# Patient Record
Sex: FEMALE | Race: WHITE | Employment: OTHER
[De-identification: names, ages, dates, MRNs, and addresses within clinical notes are randomized per-mention and may not be internally consistent; named-entity substitution may affect disease eponyms.]

---

## 2017-01-16 RX ORDER — OMEPRAZOLE 40 MG/1
40 CAPSULE, DELAYED RELEASE ORAL DAILY
Qty: 30 CAPSULE | Refills: 3 | Status: ON HOLD | OUTPATIENT
Start: 2017-01-16 | End: 2020-06-22

## 2017-01-17 ENCOUNTER — OFFICE VISIT (OUTPATIENT)
Dept: FAMILY MEDICINE CLINIC | Facility: CLINIC | Age: 82
End: 2017-01-17

## 2017-01-17 VITALS
HEART RATE: 68 BPM | SYSTOLIC BLOOD PRESSURE: 142 MMHG | BODY MASS INDEX: 20.86 KG/M2 | HEIGHT: 65 IN | DIASTOLIC BLOOD PRESSURE: 67 MMHG | OXYGEN SATURATION: 96 % | WEIGHT: 125.2 LBS | TEMPERATURE: 97 F

## 2017-01-17 DIAGNOSIS — R10.2 PELVIC PRESSURE IN FEMALE: Primary | ICD-10-CM

## 2017-01-17 DIAGNOSIS — M53.3 SACRAL PAIN: ICD-10-CM

## 2017-01-17 DIAGNOSIS — K62.89 RECTAL PAIN: ICD-10-CM

## 2017-01-17 LAB
BILIRUBIN, POC: NORMAL
BLOOD URINE, POC: NEGATIVE
CLARITY, POC: CLEAR
COLOR, POC: YELLOW
GLUCOSE URINE, POC: NEGATIVE
HEMOCCULT STL QL: NEGATIVE
HEMOCCULT STL QL: NORMAL
HEMOCCULT STL QL: NORMAL
KETONES, POC: NORMAL
LEUKOCYTE EST, POC: NEGATIVE
NITRITE, POC: NEGATIVE
PH, POC: 5.5
PROTEIN, POC: NORMAL
SPECIFIC GRAVITY, POC: 1.03
UROBILINOGEN, POC: 1

## 2017-01-17 PROCEDURE — 74020: CPT | Performed by: NURSE PRACTITIONER

## 2017-01-17 PROCEDURE — 1090F PRES/ABSN URINE INCON ASSESS: CPT | Performed by: NURSE PRACTITIONER

## 2017-01-17 PROCEDURE — G8428 CUR MEDS NOT DOCUMENT: HCPCS | Performed by: NURSE PRACTITIONER

## 2017-01-17 PROCEDURE — 99214 OFFICE O/P EST MOD 30 MIN: CPT | Performed by: NURSE PRACTITIONER

## 2017-01-17 PROCEDURE — G8484 FLU IMMUNIZE NO ADMIN: HCPCS | Performed by: NURSE PRACTITIONER

## 2017-01-17 PROCEDURE — 4040F PNEUMOC VAC/ADMIN/RCVD: CPT | Performed by: NURSE PRACTITIONER

## 2017-01-17 PROCEDURE — 81003 URINALYSIS AUTO W/O SCOPE: CPT | Performed by: NURSE PRACTITIONER

## 2017-01-17 PROCEDURE — G8419 CALC BMI OUT NRM PARAM NOF/U: HCPCS | Performed by: NURSE PRACTITIONER

## 2017-01-17 PROCEDURE — 1036F TOBACCO NON-USER: CPT | Performed by: NURSE PRACTITIONER

## 2017-01-17 PROCEDURE — 1123F ACP DISCUSS/DSCN MKR DOCD: CPT | Performed by: NURSE PRACTITIONER

## 2017-01-17 PROCEDURE — 72220 X-RAY EXAM SACRUM TAILBONE: CPT | Performed by: NURSE PRACTITIONER

## 2017-01-17 PROCEDURE — 82272 OCCULT BLD FECES 1-3 TESTS: CPT | Performed by: NURSE PRACTITIONER

## 2017-01-17 PROCEDURE — G8400 PT W/DXA NO RESULTS DOC: HCPCS | Performed by: NURSE PRACTITIONER

## 2017-01-17 ASSESSMENT — ENCOUNTER SYMPTOMS
RECTAL PAIN: 1
ABDOMINAL DISTENTION: 0
VOMITING: 0
DIARRHEA: 0
COUGH: 0
NAUSEA: 0
BLOOD IN STOOL: 0
SHORTNESS OF BREATH: 0
CHEST TIGHTNESS: 0
ANAL BLEEDING: 0
ABDOMINAL PAIN: 0
CONSTIPATION: 0

## 2020-06-21 ENCOUNTER — APPOINTMENT (OUTPATIENT)
Dept: GENERAL RADIOLOGY | Facility: CLINIC | Age: 85
DRG: 494 | End: 2020-06-21
Payer: MEDICARE

## 2020-06-21 ENCOUNTER — HOSPITAL ENCOUNTER (INPATIENT)
Age: 85
LOS: 3 days | Discharge: NURSING, REHAB OR LONG-TERM CARE | DRG: 494 | End: 2020-06-24
Attending: EMERGENCY MEDICINE | Admitting: INTERNAL MEDICINE
Payer: MEDICARE

## 2020-06-21 PROBLEM — S82.892A CLOSED LEFT ANKLE FRACTURE, INITIAL ENCOUNTER: Status: RESOLVED | Noted: 2020-06-21 | Resolved: 2020-06-21

## 2020-06-21 PROBLEM — S82.892A CLOSED LEFT ANKLE FRACTURE, INITIAL ENCOUNTER: Status: ACTIVE | Noted: 2020-06-21

## 2020-06-21 PROCEDURE — 73562 X-RAY EXAM OF KNEE 3: CPT

## 2020-06-21 PROCEDURE — 1200000000 HC SEMI PRIVATE

## 2020-06-21 PROCEDURE — 99285 EMERGENCY DEPT VISIT HI MDM: CPT

## 2020-06-21 PROCEDURE — 96375 TX/PRO/DX INJ NEW DRUG ADDON: CPT

## 2020-06-21 PROCEDURE — 73610 X-RAY EXAM OF ANKLE: CPT

## 2020-06-21 PROCEDURE — 96374 THER/PROPH/DIAG INJ IV PUSH: CPT

## 2020-06-21 PROCEDURE — 6360000002 HC RX W HCPCS: Performed by: EMERGENCY MEDICINE

## 2020-06-21 RX ORDER — MORPHINE SULFATE 2 MG/ML
2 INJECTION, SOLUTION INTRAMUSCULAR; INTRAVENOUS ONCE
Status: COMPLETED | OUTPATIENT
Start: 2020-06-21 | End: 2020-06-21

## 2020-06-21 RX ORDER — KETOROLAC TROMETHAMINE 15 MG/ML
15 INJECTION, SOLUTION INTRAMUSCULAR; INTRAVENOUS ONCE
Status: COMPLETED | OUTPATIENT
Start: 2020-06-21 | End: 2020-06-21

## 2020-06-21 RX ORDER — TRAMADOL HYDROCHLORIDE 50 MG/1
50 TABLET ORAL EVERY 6 HOURS PRN
Qty: 12 TABLET | Refills: 0 | Status: SHIPPED | OUTPATIENT
Start: 2020-06-21 | End: 2020-06-24 | Stop reason: HOSPADM

## 2020-06-21 RX ADMIN — MORPHINE SULFATE 2 MG: 2 INJECTION, SOLUTION INTRAMUSCULAR; INTRAVENOUS at 21:43

## 2020-06-21 RX ADMIN — KETOROLAC TROMETHAMINE 15 MG: 15 INJECTION, SOLUTION INTRAMUSCULAR; INTRAVENOUS at 20:52

## 2020-06-21 ASSESSMENT — PAIN DESCRIPTION - PROGRESSION
CLINICAL_PROGRESSION: RAPIDLY IMPROVING
CLINICAL_PROGRESSION: GRADUALLY IMPROVING

## 2020-06-21 ASSESSMENT — PAIN DESCRIPTION - LOCATION
LOCATION: ANKLE

## 2020-06-21 ASSESSMENT — PAIN SCALES - GENERAL
PAINLEVEL_OUTOF10: 7
PAINLEVEL_OUTOF10: 7
PAINLEVEL_OUTOF10: 9
PAINLEVEL_OUTOF10: 2
PAINLEVEL_OUTOF10: 9

## 2020-06-21 ASSESSMENT — PAIN DESCRIPTION - PAIN TYPE
TYPE: ACUTE PAIN

## 2020-06-22 ENCOUNTER — APPOINTMENT (OUTPATIENT)
Dept: GENERAL RADIOLOGY | Age: 85
DRG: 494 | End: 2020-06-22
Payer: MEDICARE

## 2020-06-22 ENCOUNTER — APPOINTMENT (OUTPATIENT)
Dept: CT IMAGING | Age: 85
DRG: 494 | End: 2020-06-22
Payer: MEDICARE

## 2020-06-22 PROBLEM — S82.892A CLOSED LEFT ANKLE FRACTURE, INITIAL ENCOUNTER: Status: ACTIVE | Noted: 2020-06-22

## 2020-06-22 PROBLEM — S82.892A CLOSED LEFT ANKLE FRACTURE, INITIAL ENCOUNTER: Status: RESOLVED | Noted: 2020-06-22 | Resolved: 2020-06-22

## 2020-06-22 LAB
ANION GAP SERPL CALCULATED.3IONS-SCNC: 10 MMOL/L (ref 9–17)
BUN BLDV-MCNC: 17 MG/DL (ref 8–23)
BUN/CREAT BLD: 22 (ref 9–20)
CALCIUM SERPL-MCNC: 8.6 MG/DL (ref 8.6–10.4)
CHLORIDE BLD-SCNC: 102 MMOL/L (ref 98–107)
CO2: 26 MMOL/L (ref 20–31)
CREAT SERPL-MCNC: 0.79 MG/DL (ref 0.5–0.9)
GFR AFRICAN AMERICAN: >60 ML/MIN
GFR NON-AFRICAN AMERICAN: >60 ML/MIN
GFR SERPL CREATININE-BSD FRML MDRD: ABNORMAL ML/MIN/{1.73_M2}
GFR SERPL CREATININE-BSD FRML MDRD: ABNORMAL ML/MIN/{1.73_M2}
GLUCOSE BLD-MCNC: 114 MG/DL (ref 70–99)
HCT VFR BLD CALC: 35.3 % (ref 36.3–47.1)
HEMOGLOBIN: 11.2 G/DL (ref 11.9–15.1)
INR BLD: 1
MCH RBC QN AUTO: 28.9 PG (ref 25.2–33.5)
MCHC RBC AUTO-ENTMCNC: 31.7 G/DL (ref 28.4–34.8)
MCV RBC AUTO: 91 FL (ref 82.6–102.9)
NRBC AUTOMATED: 0 PER 100 WBC
PDW BLD-RTO: 14.6 % (ref 11.8–14.4)
PLATELET # BLD: 196 K/UL (ref 138–453)
PMV BLD AUTO: 9.9 FL (ref 8.1–13.5)
POTASSIUM SERPL-SCNC: 4.1 MMOL/L (ref 3.7–5.3)
PROTHROMBIN TIME: 13.1 SEC (ref 11.5–14.2)
RBC # BLD: 3.88 M/UL (ref 3.95–5.11)
SARS-COV-2, PCR: NORMAL
SARS-COV-2, RAPID: NORMAL
SARS-COV-2: NOT DETECTED
SODIUM BLD-SCNC: 138 MMOL/L (ref 135–144)
SOURCE: NORMAL
WBC # BLD: 7.7 K/UL (ref 3.5–11.3)

## 2020-06-22 PROCEDURE — APPSS180 APP SPLIT SHARED TIME > 60 MINUTES: Performed by: NURSE PRACTITIONER

## 2020-06-22 PROCEDURE — 73590 X-RAY EXAM OF LOWER LEG: CPT

## 2020-06-22 PROCEDURE — 6370000000 HC RX 637 (ALT 250 FOR IP): Performed by: INTERNAL MEDICINE

## 2020-06-22 PROCEDURE — 2580000003 HC RX 258: Performed by: NURSE PRACTITIONER

## 2020-06-22 PROCEDURE — 82306 VITAMIN D 25 HYDROXY: CPT

## 2020-06-22 PROCEDURE — 1200000000 HC SEMI PRIVATE

## 2020-06-22 PROCEDURE — 85027 COMPLETE CBC AUTOMATED: CPT

## 2020-06-22 PROCEDURE — 6370000000 HC RX 637 (ALT 250 FOR IP): Performed by: NURSE PRACTITIONER

## 2020-06-22 PROCEDURE — U0003 INFECTIOUS AGENT DETECTION BY NUCLEIC ACID (DNA OR RNA); SEVERE ACUTE RESPIRATORY SYNDROME CORONAVIRUS 2 (SARS-COV-2) (CORONAVIRUS DISEASE [COVID-19]), AMPLIFIED PROBE TECHNIQUE, MAKING USE OF HIGH THROUGHPUT TECHNOLOGIES AS DESCRIBED BY CMS-2020-01-R: HCPCS

## 2020-06-22 PROCEDURE — 73620 X-RAY EXAM OF FOOT: CPT

## 2020-06-22 PROCEDURE — 80048 BASIC METABOLIC PNL TOTAL CA: CPT

## 2020-06-22 PROCEDURE — 99222 1ST HOSP IP/OBS MODERATE 55: CPT | Performed by: INTERNAL MEDICINE

## 2020-06-22 PROCEDURE — 73700 CT LOWER EXTREMITY W/O DYE: CPT

## 2020-06-22 PROCEDURE — 36415 COLL VENOUS BLD VENIPUNCTURE: CPT

## 2020-06-22 PROCEDURE — 99254 IP/OBS CNSLTJ NEW/EST MOD 60: CPT | Performed by: ORTHOPAEDIC SURGERY

## 2020-06-22 PROCEDURE — 85610 PROTHROMBIN TIME: CPT

## 2020-06-22 PROCEDURE — 84134 ASSAY OF PREALBUMIN: CPT

## 2020-06-22 PROCEDURE — 6360000002 HC RX W HCPCS: Performed by: NURSE PRACTITIONER

## 2020-06-22 RX ORDER — PANTOPRAZOLE SODIUM 40 MG/1
40 TABLET, DELAYED RELEASE ORAL
Status: DISCONTINUED | OUTPATIENT
Start: 2020-06-22 | End: 2020-06-22

## 2020-06-22 RX ORDER — NICOTINE 21 MG/24HR
1 PATCH, TRANSDERMAL 24 HOURS TRANSDERMAL DAILY PRN
Status: DISCONTINUED | OUTPATIENT
Start: 2020-06-22 | End: 2020-06-24 | Stop reason: HOSPADM

## 2020-06-22 RX ORDER — MAGNESIUM SULFATE 1 G/100ML
1 INJECTION INTRAVENOUS PRN
Status: DISCONTINUED | OUTPATIENT
Start: 2020-06-22 | End: 2020-06-24 | Stop reason: HOSPADM

## 2020-06-22 RX ORDER — POLYETHYLENE GLYCOL 3350 17 G/17G
17 POWDER, FOR SOLUTION ORAL DAILY PRN
Status: DISCONTINUED | OUTPATIENT
Start: 2020-06-22 | End: 2020-06-24 | Stop reason: HOSPADM

## 2020-06-22 RX ORDER — FAMOTIDINE 20 MG/1
40 TABLET, FILM COATED ORAL NIGHTLY
Status: DISCONTINUED | OUTPATIENT
Start: 2020-06-22 | End: 2020-06-24 | Stop reason: HOSPADM

## 2020-06-22 RX ORDER — OXYBUTYNIN CHLORIDE 5 MG/1
5 TABLET, EXTENDED RELEASE ORAL DAILY
Status: DISCONTINUED | OUTPATIENT
Start: 2020-06-22 | End: 2020-06-24 | Stop reason: HOSPADM

## 2020-06-22 RX ORDER — SODIUM CHLORIDE 0.9 % (FLUSH) 0.9 %
10 SYRINGE (ML) INJECTION PRN
Status: DISCONTINUED | OUTPATIENT
Start: 2020-06-22 | End: 2020-06-24 | Stop reason: HOSPADM

## 2020-06-22 RX ORDER — HYDROCHLOROTHIAZIDE 25 MG/1
12.5 TABLET ORAL DAILY
Status: DISCONTINUED | OUTPATIENT
Start: 2020-06-22 | End: 2020-06-24 | Stop reason: HOSPADM

## 2020-06-22 RX ORDER — POTASSIUM CHLORIDE 7.45 MG/ML
10 INJECTION INTRAVENOUS PRN
Status: DISCONTINUED | OUTPATIENT
Start: 2020-06-22 | End: 2020-06-24 | Stop reason: HOSPADM

## 2020-06-22 RX ORDER — ACETAMINOPHEN 650 MG/1
650 SUPPOSITORY RECTAL EVERY 6 HOURS PRN
Status: DISCONTINUED | OUTPATIENT
Start: 2020-06-22 | End: 2020-06-24 | Stop reason: HOSPADM

## 2020-06-22 RX ORDER — LANOLIN ALCOHOL/MO/W.PET/CERES
1000 CREAM (GRAM) TOPICAL DAILY
Status: DISCONTINUED | OUTPATIENT
Start: 2020-06-22 | End: 2020-06-24 | Stop reason: HOSPADM

## 2020-06-22 RX ORDER — ACETAMINOPHEN 325 MG/1
650 TABLET ORAL EVERY 6 HOURS PRN
Status: DISCONTINUED | OUTPATIENT
Start: 2020-06-22 | End: 2020-06-24 | Stop reason: HOSPADM

## 2020-06-22 RX ORDER — POTASSIUM CHLORIDE 20 MEQ/1
40 TABLET, EXTENDED RELEASE ORAL PRN
Status: DISCONTINUED | OUTPATIENT
Start: 2020-06-22 | End: 2020-06-24 | Stop reason: HOSPADM

## 2020-06-22 RX ORDER — PROMETHAZINE HYDROCHLORIDE 12.5 MG/1
12.5 TABLET ORAL EVERY 6 HOURS PRN
Status: DISCONTINUED | OUTPATIENT
Start: 2020-06-22 | End: 2020-06-24 | Stop reason: HOSPADM

## 2020-06-22 RX ORDER — MORPHINE SULFATE 2 MG/ML
2 INJECTION, SOLUTION INTRAMUSCULAR; INTRAVENOUS
Status: DISCONTINUED | OUTPATIENT
Start: 2020-06-22 | End: 2020-06-24 | Stop reason: HOSPADM

## 2020-06-22 RX ORDER — BUSPIRONE HYDROCHLORIDE 10 MG/1
10 TABLET ORAL 2 TIMES DAILY
Status: DISCONTINUED | OUTPATIENT
Start: 2020-06-22 | End: 2020-06-24 | Stop reason: HOSPADM

## 2020-06-22 RX ORDER — LEVOTHYROXINE SODIUM 0.03 MG/1
25 TABLET ORAL DAILY
Status: DISCONTINUED | OUTPATIENT
Start: 2020-06-22 | End: 2020-06-24 | Stop reason: HOSPADM

## 2020-06-22 RX ORDER — PANTOPRAZOLE SODIUM 40 MG/1
40 TABLET, DELAYED RELEASE ORAL EVERY MORNING
Status: ON HOLD | COMMUNITY
End: 2020-06-24 | Stop reason: HOSPADM

## 2020-06-22 RX ORDER — SODIUM CHLORIDE 0.9 % (FLUSH) 0.9 %
10 SYRINGE (ML) INJECTION EVERY 12 HOURS SCHEDULED
Status: DISCONTINUED | OUTPATIENT
Start: 2020-06-22 | End: 2020-06-24 | Stop reason: HOSPADM

## 2020-06-22 RX ORDER — TRAZODONE HYDROCHLORIDE 100 MG/1
100 TABLET ORAL NIGHTLY
Status: DISCONTINUED | OUTPATIENT
Start: 2020-06-22 | End: 2020-06-24 | Stop reason: HOSPADM

## 2020-06-22 RX ORDER — SODIUM CHLORIDE 9 MG/ML
INJECTION, SOLUTION INTRAVENOUS CONTINUOUS
Status: DISCONTINUED | OUTPATIENT
Start: 2020-06-22 | End: 2020-06-24 | Stop reason: HOSPADM

## 2020-06-22 RX ORDER — BUPROPION HYDROCHLORIDE 150 MG/1
300 TABLET ORAL EVERY MORNING
Status: DISCONTINUED | OUTPATIENT
Start: 2020-06-22 | End: 2020-06-24 | Stop reason: HOSPADM

## 2020-06-22 RX ORDER — LANOLIN ALCOHOL/MO/W.PET/CERES
1000 CREAM (GRAM) TOPICAL DAILY
COMMUNITY

## 2020-06-22 RX ORDER — IBUPROFEN 400 MG/1
400 TABLET ORAL EVERY 8 HOURS PRN
Status: DISCONTINUED | OUTPATIENT
Start: 2020-06-22 | End: 2020-06-22 | Stop reason: SDUPTHER

## 2020-06-22 RX ORDER — FAMOTIDINE 40 MG/1
40 TABLET, FILM COATED ORAL NIGHTLY
COMMUNITY

## 2020-06-22 RX ORDER — HYDROCODONE BITARTRATE AND ACETAMINOPHEN 5; 325 MG/1; MG/1
2 TABLET ORAL EVERY 4 HOURS PRN
Status: DISCONTINUED | OUTPATIENT
Start: 2020-06-22 | End: 2020-06-24 | Stop reason: HOSPADM

## 2020-06-22 RX ORDER — FLUTICASONE PROPIONATE 50 MCG
2 SPRAY, SUSPENSION (ML) NASAL DAILY
Status: DISCONTINUED | OUTPATIENT
Start: 2020-06-22 | End: 2020-06-24 | Stop reason: HOSPADM

## 2020-06-22 RX ORDER — HYDROCODONE BITARTRATE AND ACETAMINOPHEN 5; 325 MG/1; MG/1
1 TABLET ORAL EVERY 4 HOURS PRN
Status: DISCONTINUED | OUTPATIENT
Start: 2020-06-22 | End: 2020-06-24 | Stop reason: HOSPADM

## 2020-06-22 RX ORDER — MORPHINE SULFATE 4 MG/ML
4 INJECTION, SOLUTION INTRAMUSCULAR; INTRAVENOUS
Status: DISCONTINUED | OUTPATIENT
Start: 2020-06-22 | End: 2020-06-24 | Stop reason: HOSPADM

## 2020-06-22 RX ORDER — ONDANSETRON 2 MG/ML
4 INJECTION INTRAMUSCULAR; INTRAVENOUS EVERY 6 HOURS PRN
Status: DISCONTINUED | OUTPATIENT
Start: 2020-06-22 | End: 2020-06-24 | Stop reason: HOSPADM

## 2020-06-22 RX ORDER — ALPRAZOLAM 0.5 MG/1
0.5 TABLET ORAL 2 TIMES DAILY PRN
Status: DISCONTINUED | OUTPATIENT
Start: 2020-06-22 | End: 2020-06-24 | Stop reason: HOSPADM

## 2020-06-22 RX ORDER — HYDROCHLOROTHIAZIDE 25 MG/1
25 TABLET ORAL DAILY
Status: DISCONTINUED | OUTPATIENT
Start: 2020-06-22 | End: 2020-06-22

## 2020-06-22 RX ADMIN — ENOXAPARIN SODIUM 40 MG: 40 INJECTION SUBCUTANEOUS at 13:14

## 2020-06-22 RX ADMIN — HYDROCODONE BITARTRATE AND ACETAMINOPHEN 1 TABLET: 5; 325 TABLET ORAL at 18:36

## 2020-06-22 RX ADMIN — SODIUM CHLORIDE: 9 INJECTION, SOLUTION INTRAVENOUS at 18:12

## 2020-06-22 RX ADMIN — BUPROPION HYDROCHLORIDE 300 MG: 150 TABLET, EXTENDED RELEASE ORAL at 16:31

## 2020-06-22 RX ADMIN — LEVOTHYROXINE SODIUM 25 MCG: 0.03 TABLET ORAL at 05:05

## 2020-06-22 RX ADMIN — SODIUM CHLORIDE, PRESERVATIVE FREE 10 ML: 5 INJECTION INTRAVENOUS at 22:13

## 2020-06-22 RX ADMIN — TRAZODONE HYDROCHLORIDE 100 MG: 100 TABLET ORAL at 21:05

## 2020-06-22 RX ADMIN — HYDROCODONE BITARTRATE AND ACETAMINOPHEN 2 TABLET: 5; 325 TABLET ORAL at 10:43

## 2020-06-22 RX ADMIN — CYANOCOBALAMIN TAB 1000 MCG 1000 MCG: 1000 TAB at 16:35

## 2020-06-22 RX ADMIN — PANTOPRAZOLE SODIUM 40 MG: 40 TABLET, DELAYED RELEASE ORAL at 05:05

## 2020-06-22 RX ADMIN — FAMOTIDINE 40 MG: 20 TABLET ORAL at 21:06

## 2020-06-22 RX ADMIN — HYDROCODONE BITARTRATE AND ACETAMINOPHEN 2 TABLET: 5; 325 TABLET ORAL at 02:43

## 2020-06-22 RX ADMIN — BUSPIRONE HYDROCHLORIDE 10 MG: 10 TABLET ORAL at 16:31

## 2020-06-22 RX ADMIN — SODIUM CHLORIDE: 9 INJECTION, SOLUTION INTRAVENOUS at 01:23

## 2020-06-22 RX ADMIN — OXYBUTYNIN CHLORIDE 5 MG: 5 TABLET, EXTENDED RELEASE ORAL at 10:43

## 2020-06-22 ASSESSMENT — PAIN DESCRIPTION - DESCRIPTORS
DESCRIPTORS: ACHING;DISCOMFORT
DESCRIPTORS: ACHING;DISCOMFORT

## 2020-06-22 ASSESSMENT — ENCOUNTER SYMPTOMS
CHEST TIGHTNESS: 0
NAUSEA: 0
ABDOMINAL PAIN: 0
VOMITING: 0
CONSTIPATION: 0
DIARRHEA: 0
COLOR CHANGE: 0
COUGH: 0
BLOOD IN STOOL: 0
WHEEZING: 0
SHORTNESS OF BREATH: 0

## 2020-06-22 ASSESSMENT — PAIN - FUNCTIONAL ASSESSMENT
PAIN_FUNCTIONAL_ASSESSMENT: PREVENTS OR INTERFERES SOME ACTIVE ACTIVITIES AND ADLS
PAIN_FUNCTIONAL_ASSESSMENT: PREVENTS OR INTERFERES SOME ACTIVE ACTIVITIES AND ADLS

## 2020-06-22 ASSESSMENT — PAIN DESCRIPTION - ONSET
ONSET: ON-GOING
ONSET: ON-GOING

## 2020-06-22 ASSESSMENT — PAIN SCALES - GENERAL
PAINLEVEL_OUTOF10: 8
PAINLEVEL_OUTOF10: 3
PAINLEVEL_OUTOF10: 8
PAINLEVEL_OUTOF10: 0
PAINLEVEL_OUTOF10: 5
PAINLEVEL_OUTOF10: 4

## 2020-06-22 ASSESSMENT — PAIN DESCRIPTION - FREQUENCY
FREQUENCY: CONTINUOUS
FREQUENCY: CONTINUOUS

## 2020-06-22 ASSESSMENT — PAIN DESCRIPTION - ORIENTATION
ORIENTATION: LEFT
ORIENTATION: LEFT;LOWER

## 2020-06-22 ASSESSMENT — PAIN DESCRIPTION - LOCATION
LOCATION: ANKLE
LOCATION: LEG

## 2020-06-22 ASSESSMENT — PAIN DESCRIPTION - PROGRESSION
CLINICAL_PROGRESSION: NOT CHANGED
CLINICAL_PROGRESSION: GRADUALLY IMPROVING

## 2020-06-22 ASSESSMENT — PAIN DESCRIPTION - PAIN TYPE
TYPE: ACUTE PAIN
TYPE: ACUTE PAIN

## 2020-06-22 NOTE — H&P
[facility]    HISTORY AND PHYSICAL EXAMINATION            Date:   6/22/2020  Patient name:  Christopher Mccarthy  Date of admission:  6/21/2020  8:34 PM  MRN:   [id number]  Account:  [de-identified]  YOB: 1935  PCP:    FERNANDO Moe CNP  Room:   2009/2009-02  Code Status:    Full Code    Chief Complaint:     Chief Complaint   Patient presents with    Fall    Ankle Pain     History Obtained From:     Patient and electronic medical record. History of Present Illness:     Christopher Mccarthy is a 82 y.o. Non-/non  female who presents with Fall and Ankle Pain   and is admitted to the hospital for the management of Closed trimalleolar fracture of left ankle. The patient reports to the hospital with complaint of fall and left ankle injury. The patient states that she was going down her stairs when she missed stepped and fell. She denies hitting her head or loss of consciousness. She does endorse an injury to her left ankle. She states she was unable to bear weight on it after the fall. She endorses pain to the area that she describes as burning and 9/10 in intensity. She states her pain is aggravated with movement and has been greatly improved with administered analgesics. Orthopedic surgery was consulted in the ER, no immediate plan for surgical intervention. The patient failed ambulation trial in the ER with the use of crutches. She has past medical history that includes both thyroidism, hypertension, anemia and GERD. X-ray left knee indicates no acute osseous abnormality of the left knee. X-ray left ankle indicates acute trimalleolar fracture of the left ankle with lateral talar tilt and posterior talar subluxation.     Past Medical History:     Past Medical History:   Diagnosis Date    Anemia     Anxiety     Depression     GERD (gastroesophageal reflux disease)     Hypertension     Hypothyroidism     Doxycycline; Paroxetine; Paroxetine hcl; Penicillins; and Sulfa antibiotics    Social History:     Tobacco:    reports that she has never smoked. She has never used smokeless tobacco.  Alcohol:      reports current alcohol use. Drug Use:  reports no history of drug use. Family History:     Family History   Problem Relation Age of Onset    Stroke Mother     High Blood Pressure Mother        Review of Systems:     Positive and Negative as described in HPI. Review of Systems   Constitutional: Negative for chills, diaphoresis and fever. HENT: Negative for congestion. Eyes: Negative for visual disturbance. Respiratory: Negative for cough, chest tightness, shortness of breath and wheezing. Cardiovascular: Negative for chest pain, palpitations and leg swelling. Gastrointestinal: Negative for abdominal pain, blood in stool, constipation, diarrhea, nausea and vomiting. Endocrine: Negative for cold intolerance and heat intolerance. Genitourinary: Negative for difficulty urinating, dysuria, frequency and urgency. Musculoskeletal: Positive for arthralgias, gait problem and myalgias. Skin: Negative for color change and rash. Neurological: Negative for dizziness, weakness, light-headedness, numbness and headaches. Hematological: Does not bruise/bleed easily. Psychiatric/Behavioral: The patient is not nervous/anxious. All other systems reviewed and are negative. Physical Exam:   BP (!) 149/62   Pulse 69   Temp 98.1 °F (36.7 °C) (Oral)   Resp 16   Ht 5' 5\" (1.651 m)   Wt 149 lb (67.6 kg)   SpO2 97%   BMI 24.79 kg/m²   Temp (24hrs), Av.2 °F (36.8 °C), Min:98.1 °F (36.7 °C), Max:98.2 °F (36.8 °C)    No results for input(s): POCGLU in the last 72 hours. No intake or output data in the 24 hours ending 20 0054    Physical Exam  Vitals signs and nursing note reviewed. Constitutional:       General: She is not in acute distress. Appearance: She is not diaphoretic.    HENT: Head: Normocephalic and atraumatic. Right Ear: Hearing normal.      Left Ear: Hearing normal.      Nose: Nose normal. No rhinorrhea. Eyes:      General: Lids are normal.      Extraocular Movements:      Right eye: Normal extraocular motion. Left eye: Normal extraocular motion. Conjunctiva/sclera: Conjunctivae normal.      Right eye: Right conjunctiva is not injected. Left eye: Left conjunctiva is not injected. Pupils: Pupils are equal, round, and reactive to light. Pupils are equal.      Right eye: Pupil is reactive. Left eye: Pupil is reactive. Neck:      Musculoskeletal: Neck supple. Thyroid: No thyromegaly. Vascular: No carotid bruit. Trachea: Trachea and phonation normal. No tracheal deviation. Cardiovascular:      Rate and Rhythm: Normal rate and regular rhythm. Pulses: Normal pulses. Heart sounds: Normal heart sounds. No murmur. Pulmonary:      Effort: Pulmonary effort is normal. No respiratory distress. Breath sounds: Normal breath sounds. No stridor. No decreased breath sounds. Abdominal:      General: Bowel sounds are normal. There is no distension. Palpations: Abdomen is soft. There is no mass. Tenderness: There is no abdominal tenderness. There is no guarding. Musculoskeletal:      Right foot: Decreased range of motion. Tenderness present. Comments: Left ankle splinted, ACE wrap in place. Skin:     General: Skin is warm and dry. Findings: No erythema, lesion or rash. Neurological:      Mental Status: She is alert and oriented to person, place, and time. She is not disoriented. Cranial Nerves: No cranial nerve deficit. Psychiatric:         Speech: Speech normal.         Behavior: Behavior normal. Behavior is cooperative. Investigations:      Laboratory Testing:  No results found for this or any previous visit (from the past 24 hour(s)).     Imaging/Diagnostics:  Xr Knee Left (3 Views)    Result

## 2020-06-22 NOTE — ED PROVIDER NOTES
TABLET        TRIAMCINOLONE (KENALOG) 0.1 % CREAM           ALLERGIES     is allergic to ampicillin; erythromycin; doxycycline; paroxetine; paroxetine hcl; penicillins; and sulfa antibiotics. FAMILY HISTORY     She indicated that her mother is . She indicated that her father is . family history includes High Blood Pressure in her mother; Stroke in her mother. SOCIAL HISTORY      reports that she has never smoked. She has never used smokeless tobacco. She reports current alcohol use. She reports that she does not use drugs. PHYSICAL EXAM     INITIAL VITALS:  height is 5' 5\" (1.651 m) and weight is 63.5 kg (140 lb). Her oral temperature is 98.2 °F (36.8 °C). Her blood pressure is 175/71 (abnormal) and her pulse is 67. Her respiration is 18 and oxygen saturation is 97%. Gen.: Patient very pleasant, elderly female in no apparent distress. HEENT: Head is atraumatic. Respiratory: Lung sounds are clear bilateral.  Cardiac: Heart is regular rate and rhythm  Extremity: Examination left lower extremity reveals gross deformity at the ankle with swelling and ecchymosis. Neurovascularly intact distally    DIFFERENTIAL DIAGNOSIS/ MDM:     Fracture    DIAGNOSTIC RESULTS         RADIOLOGY:   I directly visualized the following  images and reviewed the radiologist interpretations:  XR ANKLE LEFT (MIN 3 VIEWS)   Final Result   Acute trimalleolar fracture of the left ankle with lateral talar tilt and   posterior talar subluxation. XR KNEE LEFT (3 VIEWS)   Final Result   No acute osseous abnormality of the left knee.                LABS:  Labs Reviewed - No data to display      EMERGENCY DEPARTMENT COURSE:   Vitals:    Vitals:    20   BP:  (!) 175/71   Pulse: 67    Resp: 18    Temp: 98.2 °F (36.8 °C)    TempSrc: Oral    SpO2: 97%    Weight: 63.5 kg (140 lb)    Height: 5' 5\" (1.651 m)      -------------------------  BP: (!) 175/71, Temp: 98.2 °F (36.8 °C), Pulse: 67, Resp: 18    Orders Placed This Encounter   Medications    ketorolac (TORADOL) injection 15 mg    morphine (PF) injection 2 mg    traMADol (ULTRAM) 50 MG tablet     Sig: Take 1 tablet by mouth every 6 hours as needed for Pain for up to 3 days. Intended supply: 3 days. Take lowest dose possible to manage pain     Dispense:  12 tablet     Refill:  0           Re-evaluation Notes    X-ray, per radiologist, shows a trimalleolar fracture. I did speak with orthopedist on-call, who did not recommend admission. He would see her in the office. We did give the patient crutches. Patient was not able to maneuver with crutches because a fall risk. I do feel she needs to be admitted. She will eventually need surgery and rehab. I did speak with nurse practitioner on-call Niocle Davis, who is agreeable        FINAL IMPRESSION      1. Closed left ankle fracture, initial encounter          DISPOSITION/PLAN   DISPOSITION Decision To Admit 06/21/2020 10:43:34 PM      Condition on Disposition    Stable    PATIENT REFERRED TO:  Quinn Schroeder MD  [address]  [phone]    In 1 day        DISCHARGE MEDICATIONS:  New Prescriptions    TRAMADOL (ULTRAM) 50 MG TABLET    Take 1 tablet by mouth every 6 hours as needed for Pain for up to 3 days. Intended supply: 3 days. Take lowest dose possible to manage pain       (Please note that portions of this note were completed with a voice recognition program.  Efforts were made to edit the dictations but occasionally words are mis-transcribed.)    Lopez MD, F.A.C.E.P.   Attending Emergency Physician        Emilia Carmona MD  06/21/20 [id number]

## 2020-06-22 NOTE — CONSULTS
Aj Putt, APRN - CNP        potassium chloride (KLOR-CON M) extended release tablet 40 mEq  40 mEq Oral PRN Aj Putt, APRN - CNP        Or    potassium bicarb-citric acid (EFFER-K) effervescent tablet 40 mEq  40 mEq Oral PRN Aj Putt, APRN - CNP        Or    potassium chloride 10 mEq/100 mL IVPB (Peripheral Line)  10 mEq Intravenous PRN Aj Putt, APRN - CNP        magnesium sulfate 1 g in dextrose 5% 100 mL IVPB  1 g Intravenous PRN Aj Putt, APRN - CNP        acetaminophen (TYLENOL) tablet 650 mg  650 mg Oral Q6H PRN Aj Putt, APRN - CNP        Or    acetaminophen (TYLENOL) suppository 650 mg  650 mg Rectal Q6H PRN Aj Putt, APRN - CNP        polyethylene glycol (GLYCOLAX) packet 17 g  17 g Oral Daily PRN Aj Putt, APRN - CNP        promethazine (PHENERGAN) tablet 12.5 mg  12.5 mg Oral Q6H PRN Aj Putt, APRN - CNP        Or    ondansetron (ZOFRAN) injection 4 mg  4 mg Intravenous Q6H PRN Aj Putt, APRN - CNP        nicotine (NICODERM CQ) 21 MG/24HR 1 patch  1 patch Transdermal Daily PRN Aj Putt, APRN - CNP        enoxaparin (LOVENOX) injection 40 mg  40 mg Subcutaneous Daily Aj Putt, APRN - CNP   Stopped at 06/23/20 0900    0.9 % sodium chloride infusion   Intravenous Continuous Aj Putt, APRN - CNP 75 mL/hr at 06/22/20 0123      HYDROcodone-acetaminophen (NORCO) 5-325 MG per tablet 1 tablet  1 tablet Oral Q4H PRN Aj Putt, APRN - CNP        Or    HYDROcodone-acetaminophen (NORCO) 5-325 MG per tablet 2 tablet  2 tablet Oral Q4H PRN Aj Putt, APRN - CNP   2 tablet at 06/22/20 1043    morphine (PF) injection 2 mg  2 mg Intravenous Q2H PRN Aj Putt, APRN - CNP        Or    morphine sulfate (PF) injection 4 mg  4 mg Intravenous Q2H PRN Aj Putt, APRN - CNP        ALPRAZolam Pascual Kulkarni) tablet 0.5 mg  0.5 mg Oral BID PRN Miroslava Blend, DO        buPROPion [facility] XL) extended release tablet 300 mg  300 mg Oral QAM Miroslava Blend, DO   300 mg at 06/22/20 1631    busPIRone (BUSPAR) tablet 10 mg  10 mg Oral BID Miroslava Blend, DO   10 mg at 06/22/20 1631    fluticasone (FLONASE) 50 MCG/ACT nasal spray 2 spray  2 spray Nasal Daily Miroslava Blend, DO        traZODone (DESYREL) tablet 100 mg  100 mg Oral Nightly Miroslava Blend, DO        hydroCHLOROthiazide (HYDRODIURIL) tablet 12.5 mg  12.5 mg Oral Daily Judyann Single, APRN - NP        famotidine (PEPCID) tablet 40 mg  40 mg Oral Nightly Judyann Single, APRN - NP        vitamin B-12 (CYANOCOBALAMIN) tablet 1,000 mcg  1,000 mcg Oral Daily Judyann Single, APRN - NP   1,000 mcg at 06/22/20 1635       Allergies:    Ampicillin; Erythromycin; Doxycycline; Paroxetine; Paroxetine hcl; Penicillins; and Sulfa antibiotics    Family History:  Family History   Problem Relation Age of Onset    Stroke Mother     High Blood Pressure Mother        Social History:   Social History     Socioeconomic History    Marital status:       Spouse name: Not on file    Number of children: Not on file    Years of education: Not on file    Highest education level: Not on file   Occupational History    Not on file   Social Needs    Financial resource strain: Not on file    Food insecurity     Worry: Not on file     Inability: Not on file    Transportation needs     Medical: Not on file     Non-medical: Not on file   Tobacco Use    Smoking status: Never Smoker    Smokeless tobacco: Never Used   Substance and Sexual Activity    Alcohol use: Yes     Comment: occas    Drug use: No    Sexual activity: Not Currently   Lifestyle    Physical activity     Days per week: Not on file     Minutes per session: Not on file    Stress: Not on file   Relationships    Social connections     Talks on phone: Not on file     Gets together: Not on file     Attends Mandaeism service: Not on file     Active member of club or organization: Not on file     Attends meetings of clubs or organizations: Not on file     Relationship status: Not on file    Intimate partner violence     Fear of current or ex partner: Not on file     Emotionally abused: Not on file     Physically abused: Not on file     Forced sexual activity: Not on file   Other Topics Concern    Not on file   Social History Narrative    Not on file        OBJECTIVE:  /63   Pulse 57   Temp 98 °F (36.7 °C) (Oral)   Resp 16   Ht 5' 5\" (1.651 m)   Wt 149 lb (67.6 kg)   SpO2 96%   BMI 24.79 kg/m²    Psych: awake, alert, no acute distress and alert and oriented to person, time, and place. Cardio:  well perfused extremities  Resp:  normal respiratory effort  Skin:  no cyanosis  Hem/lymph:  no lymphedema  Neuro:  sensation to light touch grossly intact at splint margins   Musculoskeletal:    MUSCULOSKELETAL (affected left lower extremity):  (splint left in place for exam)  Vascular: Toes warm and well perfused, compartments soft/compressible. Skin:  Intact at splint margins, without rash/lesions/AV malformations. Motion: Able to wiggle toes   -Range of motion not tested due to pain/splint  -No tenderness at knee or proximal leg   -Tenderness to palpation:  Ankle diffusely         MUSCULOSKELETAL (unaffected/right lower extremity):  Vascular: The foot is warm and well-perfused, good capillary refill. Skin: Intact, no erythema/ulcers. No significant swelling.   Musculoskeletal: Able to fire IP/Quads/EHL/TA/GCS   Range of Motion: Grossly normal and painless knee hip and ankle range of motion  Stable hip/knee/ankle exam  Tenderness to palpation: none  No focal neurologic deficits     Secondary survey exam:  No gross deformity, erythema, or significant tenderness of other bony prominences/joints   No tenderness to palpation over other bony prominences/joints of the bilateral upper and lower extremities   Log roll test negative on contralateral hip  Grossly neurovascularly intact bilateral upper extremity without focal deficit      RADIOLOGY:   Radiographic images reviewed. Trimalleolar ankle fracture-dislocation with large posterior malleolus and comminution. Xr Knee Left (3 Views)    Result Date: 6/21/2020  EXAMINATION: THREE XRAY VIEWS OF THE LEFT KNEE 6/21/2020 9:05 pm COMPARISON: None. HISTORY: ORDERING SYSTEM PROVIDED HISTORY: Pain TECHNOLOGIST PROVIDED HISTORY: Pain Reason for Exam: Pt fell injuring her left knee and ankle. Acuity: Acute Type of Exam: Initial FINDINGS: There is no acute fracture or suspect osseous lesion. Joint spaces and alignment are maintained. No soft tissue abnormality or joint effusion is evident. No acute osseous abnormality of the left knee. Xr Tibia Fibula Left (2 Views)    Result Date: 6/22/2020  EXAMINATION: FOUR XRAY VIEWS OF THE LEFT TIBIA AND FIBULA 6/22/2020 12:21 pm COMPARISON: 06/21/2020 HISTORY: ORDERING SYSTEM PROVIDED HISTORY: tib/fib fx TECHNOLOGIST PROVIDED HISTORY: tib/fib fx Reason for Exam: fx Acuity: Unknown Relevant Medical/Surgical History: fx in lower lt leg/foot FINDINGS: External cast is in place. There is redemonstration of a fracture dislocation of the left ankle. The talus is dislocated posteriorly and there is evidence of a trimalleolar fracture of the left ankle. Sequela of old trauma distal shaft left tibia is noted. Fine bony detail is obscured by the overlying cast.  The knee joint is in anatomic alignment. Trimalleolar fracture dislocation left ankle. Xr Ankle Left (min 3 Views)    Result Date: 6/21/2020  EXAMINATION: THREE XRAY VIEWS OF THE LEFT ANKLE 6/21/2020 9:05 pm COMPARISON: None. HISTORY: ORDERING SYSTEM PROVIDED HISTORY: Pain TECHNOLOGIST PROVIDED HISTORY: Pain Reason for Exam: Pt fell injuring her left knee and ankle.  Acuity: Acute Type of Exam: Initial FINDINGS: Acute trimalleolar with closed treatment, I have recommended a surgical intervention to stabilize the fracture and improve the position to help restore length, alignment, and rotation. I spoke to the patient about the risks/benefits/alternatives to a surgical intervention. The patient understands that the risks of surgery may include but are not limited to pain, infection, delayed wound healing, bleeding, blood clot, scarring/stiffness, cosmetic deformity, decreased strength/weakness, future surgery, damage to soft tissue/vessel/nerve, neuroma/neuritis, iatrogenic fracture/dislocation, damage to joint(s), nonunion, malunion, failure of hardware/fixation/surgery, worsening of condition or recurrence, limb length discrepancy, avascular necrosis of bone, neurovascular compromise/compartment syndrome, failure of surgery, dissatisfaction with outcome, loss of limb, stroke, heart attack, pulmonary embolus, mental status change, anesthesia risks, and even death. The patient expressed verbal understanding of these risks and wishes to proceed with surgical intervention. All questions were answered. No guarantees were made. Informed consent was obtained. The patient was counseled about the risks of maribel Covid-19 during the perioperative period and any recovery window from their procedure. The patient was made aware that maribel Covid-19 may worsen their prognosis for recovering from their procedure and lend to a higher morbidity and/or mortality risk. All material risks, benefits, and reasonable alternatives including postponing the procedure were discussed. The patient does wish to proceed with their procedure at this time. We also had a discussion about the risk of blood clot and thromboembolic events.  The patient understands that there is an increased risk with surgery/immobilization, and understands nothing will completely eliminate the risk of DVT/PE's, and that any prophylactic medication does not substitute for early

## 2020-06-22 NOTE — CARE COORDINATION
Social Work-Met with patient to discuss dc options. The Plan for Transition of Care is related to the following treatment goals: SNF with PT/OT and skilled nursing    The Patient and/or patient representative patietn was provided with a choice of provider and agrees   with the discharge plan. [x] Yes [] No    Freedom of choice list was provided with basic dialogue that supports the patient's individualized plan of care/goals, treatment preferences and shares the quality data associated with the providers.  [x] Yes [] No. Patientwould like [facility] as first choice and second choice is [facility]

## 2020-06-22 NOTE — PROGRESS NOTES
Physical Therapy  DATE: 2020    NAME: Agustina Cash  MRN: [id number]   : 1935    Patient not seen this date for Physical Therapy due to:  [] Blood transfusion in progress  [] Hemodialysis  []  Patient Declined  [] Spine Precautions   [] Strict Bedrest  [] Surgery/ Procedure  [] Testing      [x] Other   ORTHO CONSULT TODAY     [] PT being discontinued at this time. Patient independent. No further needs. [] PT being discontinued at this time as the patient has been transferred to palliative care. No further needs.     Tatiana Alcazar, PT

## 2020-06-22 NOTE — ED NOTES
Pt presents to the ED with injury/deofrmity of the left ankle following a trip and fall down a few stars. Pt denies dizziness/syncope preceeding the fall. Pt denies head injury/LOC following the fall. Neurovascular status intact, pulses present.       Traci Malin RN  76/10/38 2103

## 2020-06-22 NOTE — DISCHARGE INSTR - COC
96%   BMI 24.79 kg/m²     Last documented pain score (0-10 scale): Pain Level: 8  Last Weight:   Wt Readings from Last 1 Encounters:   06/22/20 149 lb (67.6 kg)     Mental Status:  oriented, alert and coherent    IV Access:  - None    Nursing Mobility/ADLs:  Walking   Assisted  Transfer  Assisted  Bathing  Assisted  Dressing  Assisted  Toileting  Assisted  Feeding  Independent  Med Admin  Assisted  Med Delivery   none    Wound Care Documentation and Therapy:        Elimination:  Continence:   · Bowel: Yes  · Bladder: Yes  Urinary Catheter: None   Colostomy/Ileostomy/Ileal Conduit: No       Date of Last BM: 06/21/2020    Intake/Output Summary (Last 24 hours) at 6/22/2020 1546  Last data filed at 6/22/2020 1354  Gross per 24 hour   Intake --   Output 700 ml   Net -700 ml     I/O last 3 completed shifts:  In: -   Out: 700 [Urine:700]    Safety Concerns:     History of Falls (last 30 days) and At Risk for Falls    Impairments/Disabilities:      Vision    Nutrition Therapy:  Current Nutrition Therapy:   - Oral Diet:  General  - Oral Nutrition Supplement:  Wound Healing  twice a day    Routes of Feeding: Oral  Liquids: No Restrictions  Daily Fluid Restriction: no  Last Modified Barium Swallow with Video (Video Swallowing Test): not done    Treatments at the Time of Hospital Discharge:   Respiratory Treatments: N/A  Oxygen Therapy:  is not on home oxygen therapy. Ventilator:    - No ventilator support    Rehab Therapies: Physical Therapy and Occupational Therapy  Weight Bearing Status/Restrictions: Non-weight bearing on left leg  Other Medical Equipment (for information only, NOT a DME order): Walker, crutches, and bedside commode  FYI - for patient instructions: Dr Michael Lee ordered 20 tablets of Percocet, 6 weeks of daily aspirin 325), and docusate and ondansetron. Per doctor, patient only needs to take one anticoagulant (Aspirin or Lovenox), but not both.  For pain, patient should also only take one med (Percocet or

## 2020-06-23 ENCOUNTER — ANESTHESIA (OUTPATIENT)
Dept: OPERATING ROOM | Age: 85
DRG: 494 | End: 2020-06-23
Payer: MEDICARE

## 2020-06-23 ENCOUNTER — APPOINTMENT (OUTPATIENT)
Dept: GENERAL RADIOLOGY | Age: 85
DRG: 494 | End: 2020-06-23
Payer: MEDICARE

## 2020-06-23 ENCOUNTER — ANESTHESIA EVENT (OUTPATIENT)
Dept: OPERATING ROOM | Age: 85
DRG: 494 | End: 2020-06-23
Payer: MEDICARE

## 2020-06-23 VITALS — DIASTOLIC BLOOD PRESSURE: 65 MMHG | TEMPERATURE: 97.2 F | OXYGEN SATURATION: 100 % | SYSTOLIC BLOOD PRESSURE: 134 MMHG

## 2020-06-23 LAB
PREALBUMIN: 15.9 MG/DL (ref 20–40)
VITAMIN D 25-HYDROXY: 42.9 NG/ML (ref 30–100)

## 2020-06-23 PROCEDURE — 27823 TREATMENT OF ANKLE FRACTURE: CPT | Performed by: ORTHOPAEDIC SURGERY

## 2020-06-23 PROCEDURE — 73610 X-RAY EXAM OF ANKLE: CPT

## 2020-06-23 PROCEDURE — 7100000000 HC PACU RECOVERY - FIRST 15 MIN: Performed by: ORTHOPAEDIC SURGERY

## 2020-06-23 PROCEDURE — 0QSK04Z REPOSITION LEFT FIBULA WITH INTERNAL FIXATION DEVICE, OPEN APPROACH: ICD-10-PCS | Performed by: ORTHOPAEDIC SURGERY

## 2020-06-23 PROCEDURE — 99232 SBSQ HOSP IP/OBS MODERATE 35: CPT | Performed by: INTERNAL MEDICINE

## 2020-06-23 PROCEDURE — 6360000002 HC RX W HCPCS: Performed by: NURSE ANESTHETIST, CERTIFIED REGISTERED

## 2020-06-23 PROCEDURE — 2580000003 HC RX 258: Performed by: NURSE ANESTHETIST, CERTIFIED REGISTERED

## 2020-06-23 PROCEDURE — 6360000002 HC RX W HCPCS: Performed by: ANESTHESIOLOGY

## 2020-06-23 PROCEDURE — C1713 ANCHOR/SCREW BN/BN,TIS/BN: HCPCS | Performed by: ORTHOPAEDIC SURGERY

## 2020-06-23 PROCEDURE — 2720000010 HC SURG SUPPLY STERILE: Performed by: ORTHOPAEDIC SURGERY

## 2020-06-23 PROCEDURE — 6360000002 HC RX W HCPCS: Performed by: NURSE PRACTITIONER

## 2020-06-23 PROCEDURE — 0QSH04Z REPOSITION LEFT TIBIA WITH INTERNAL FIXATION DEVICE, OPEN APPROACH: ICD-10-PCS | Performed by: ORTHOPAEDIC SURGERY

## 2020-06-23 PROCEDURE — 3700000001 HC ADD 15 MINUTES (ANESTHESIA): Performed by: ORTHOPAEDIC SURGERY

## 2020-06-23 PROCEDURE — 7100000001 HC PACU RECOVERY - ADDTL 15 MIN: Performed by: ORTHOPAEDIC SURGERY

## 2020-06-23 PROCEDURE — 3E0T3BZ INTRODUCTION OF ANESTHETIC AGENT INTO PERIPHERAL NERVES AND PLEXI, PERCUTANEOUS APPROACH: ICD-10-PCS | Performed by: ANESTHESIOLOGY

## 2020-06-23 PROCEDURE — 2500000003 HC RX 250 WO HCPCS: Performed by: NURSE ANESTHETIST, CERTIFIED REGISTERED

## 2020-06-23 PROCEDURE — 2500000003 HC RX 250 WO HCPCS: Performed by: ANESTHESIOLOGY

## 2020-06-23 PROCEDURE — 6370000000 HC RX 637 (ALT 250 FOR IP): Performed by: ORTHOPAEDIC SURGERY

## 2020-06-23 PROCEDURE — 2580000003 HC RX 258: Performed by: NURSE PRACTITIONER

## 2020-06-23 PROCEDURE — 6370000000 HC RX 637 (ALT 250 FOR IP): Performed by: NURSE PRACTITIONER

## 2020-06-23 PROCEDURE — 3700000000 HC ANESTHESIA ATTENDED CARE: Performed by: ORTHOPAEDIC SURGERY

## 2020-06-23 PROCEDURE — 64446 NJX AA&/STRD SC NRV NFS IMG: CPT | Performed by: ANESTHESIOLOGY

## 2020-06-23 PROCEDURE — 2709999900 HC NON-CHARGEABLE SUPPLY: Performed by: ORTHOPAEDIC SURGERY

## 2020-06-23 PROCEDURE — 1200000000 HC SEMI PRIVATE

## 2020-06-23 PROCEDURE — 3600000002 HC SURGERY LEVEL 2 BASE: Performed by: ORTHOPAEDIC SURGERY

## 2020-06-23 PROCEDURE — 2500000003 HC RX 250 WO HCPCS: Performed by: ORTHOPAEDIC SURGERY

## 2020-06-23 PROCEDURE — 3600000012 HC SURGERY LEVEL 2 ADDTL 15MIN: Performed by: ORTHOPAEDIC SURGERY

## 2020-06-23 PROCEDURE — 6370000000 HC RX 637 (ALT 250 FOR IP): Performed by: INTERNAL MEDICINE

## 2020-06-23 PROCEDURE — 3209999900 FLUORO FOR SURGICAL PROCEDURES

## 2020-06-23 DEVICE — LOCKING SCREW
Type: IMPLANTABLE DEVICE | Site: ANKLE | Status: FUNCTIONAL
Brand: VARIAX

## 2020-06-23 DEVICE — BROAD Y-PLATE
Type: IMPLANTABLE DEVICE | Site: ANKLE | Status: FUNCTIONAL
Brand: VARIAX

## 2020-06-23 DEVICE — BONE SCREW
Type: IMPLANTABLE DEVICE | Site: ANKLE | Status: FUNCTIONAL
Brand: VARIAX

## 2020-06-23 DEVICE — PIN ANCHORAGE FIX: Type: IMPLANTABLE DEVICE | Site: ANKLE | Status: FUNCTIONAL

## 2020-06-23 DEVICE — CANNULATED SCREW
Type: IMPLANTABLE DEVICE | Site: ANKLE | Status: FUNCTIONAL
Brand: ASNIS

## 2020-06-23 DEVICE — DISTAL LATERAL FIBULA PLATE, 4 HOLE
Type: IMPLANTABLE DEVICE | Site: ANKLE | Status: FUNCTIONAL
Brand: VARIAX

## 2020-06-23 RX ORDER — GLYCOPYRROLATE 1 MG/5 ML
SYRINGE (ML) INTRAVENOUS PRN
Status: DISCONTINUED | OUTPATIENT
Start: 2020-06-23 | End: 2020-06-23 | Stop reason: SDUPTHER

## 2020-06-23 RX ORDER — LIDOCAINE HYDROCHLORIDE 10 MG/ML
INJECTION, SOLUTION INFILTRATION; PERINEURAL PRN
Status: DISCONTINUED | OUTPATIENT
Start: 2020-06-23 | End: 2020-06-23 | Stop reason: SDUPTHER

## 2020-06-23 RX ORDER — SODIUM CHLORIDE 9 MG/ML
INJECTION, SOLUTION INTRAVENOUS CONTINUOUS PRN
Status: DISCONTINUED | OUTPATIENT
Start: 2020-06-23 | End: 2020-06-23 | Stop reason: SDUPTHER

## 2020-06-23 RX ORDER — ROPIVACAINE HYDROCHLORIDE 5 MG/ML
INJECTION, SOLUTION EPIDURAL; INFILTRATION; PERINEURAL PRN
Status: DISCONTINUED | OUTPATIENT
Start: 2020-06-23 | End: 2020-06-23 | Stop reason: SDUPTHER

## 2020-06-23 RX ORDER — FENTANYL CITRATE 50 UG/ML
25 INJECTION, SOLUTION INTRAMUSCULAR; INTRAVENOUS EVERY 5 MIN PRN
Status: DISCONTINUED | OUTPATIENT
Start: 2020-06-23 | End: 2020-06-23 | Stop reason: HOSPADM

## 2020-06-23 RX ORDER — FENTANYL CITRATE 50 UG/ML
INJECTION, SOLUTION INTRAMUSCULAR; INTRAVENOUS PRN
Status: DISCONTINUED | OUTPATIENT
Start: 2020-06-23 | End: 2020-06-23 | Stop reason: SDUPTHER

## 2020-06-23 RX ORDER — ONDANSETRON 2 MG/ML
4 INJECTION INTRAMUSCULAR; INTRAVENOUS
Status: DISCONTINUED | OUTPATIENT
Start: 2020-06-23 | End: 2020-06-23 | Stop reason: HOSPADM

## 2020-06-23 RX ORDER — HYDROMORPHONE HCL 110MG/55ML
0.5 PATIENT CONTROLLED ANALGESIA SYRINGE INTRAVENOUS EVERY 5 MIN PRN
Status: DISCONTINUED | OUTPATIENT
Start: 2020-06-23 | End: 2020-06-23 | Stop reason: HOSPADM

## 2020-06-23 RX ORDER — ROCURONIUM BROMIDE 10 MG/ML
INJECTION, SOLUTION INTRAVENOUS PRN
Status: DISCONTINUED | OUTPATIENT
Start: 2020-06-23 | End: 2020-06-23 | Stop reason: SDUPTHER

## 2020-06-23 RX ORDER — CETIRIZINE HYDROCHLORIDE 5 MG/1
5 TABLET ORAL DAILY
Status: DISCONTINUED | OUTPATIENT
Start: 2020-06-24 | End: 2020-06-24 | Stop reason: HOSPADM

## 2020-06-23 RX ORDER — CLINDAMYCIN PHOSPHATE 600 MG/50ML
600 INJECTION INTRAVENOUS EVERY 8 HOURS
Status: COMPLETED | OUTPATIENT
Start: 2020-06-23 | End: 2020-06-24

## 2020-06-23 RX ORDER — FENTANYL CITRATE 50 UG/ML
50 INJECTION, SOLUTION INTRAMUSCULAR; INTRAVENOUS EVERY 5 MIN PRN
Status: DISCONTINUED | OUTPATIENT
Start: 2020-06-23 | End: 2020-06-23 | Stop reason: HOSPADM

## 2020-06-23 RX ORDER — LIDOCAINE HYDROCHLORIDE 20 MG/ML
INJECTION, SOLUTION EPIDURAL; INFILTRATION; INTRACAUDAL; PERINEURAL PRN
Status: DISCONTINUED | OUTPATIENT
Start: 2020-06-23 | End: 2020-06-23 | Stop reason: SDUPTHER

## 2020-06-23 RX ORDER — HYDROMORPHONE HCL 110MG/55ML
0.25 PATIENT CONTROLLED ANALGESIA SYRINGE INTRAVENOUS EVERY 5 MIN PRN
Status: DISCONTINUED | OUTPATIENT
Start: 2020-06-23 | End: 2020-06-23 | Stop reason: HOSPADM

## 2020-06-23 RX ORDER — HYDRALAZINE HYDROCHLORIDE 20 MG/ML
10 INJECTION INTRAMUSCULAR; INTRAVENOUS EVERY 6 HOURS PRN
Status: DISCONTINUED | OUTPATIENT
Start: 2020-06-23 | End: 2020-06-24 | Stop reason: HOSPADM

## 2020-06-23 RX ORDER — GINSENG 100 MG
CAPSULE ORAL PRN
Status: DISCONTINUED | OUTPATIENT
Start: 2020-06-23 | End: 2020-06-23 | Stop reason: HOSPADM

## 2020-06-23 RX ORDER — ONDANSETRON 2 MG/ML
INJECTION INTRAMUSCULAR; INTRAVENOUS PRN
Status: DISCONTINUED | OUTPATIENT
Start: 2020-06-23 | End: 2020-06-23 | Stop reason: SDUPTHER

## 2020-06-23 RX ORDER — CLINDAMYCIN PHOSPHATE 300 MG/50ML
INJECTION INTRAVENOUS PRN
Status: DISCONTINUED | OUTPATIENT
Start: 2020-06-23 | End: 2020-06-23 | Stop reason: SDUPTHER

## 2020-06-23 RX ORDER — PROPOFOL 10 MG/ML
INJECTION, EMULSION INTRAVENOUS PRN
Status: DISCONTINUED | OUTPATIENT
Start: 2020-06-23 | End: 2020-06-23 | Stop reason: SDUPTHER

## 2020-06-23 RX ORDER — LABETALOL HYDROCHLORIDE 5 MG/ML
INJECTION, SOLUTION INTRAVENOUS PRN
Status: DISCONTINUED | OUTPATIENT
Start: 2020-06-23 | End: 2020-06-23 | Stop reason: SDUPTHER

## 2020-06-23 RX ORDER — NEOSTIGMINE METHYLSULFATE 5 MG/5 ML
SYRINGE (ML) INTRAVENOUS PRN
Status: DISCONTINUED | OUTPATIENT
Start: 2020-06-23 | End: 2020-06-23 | Stop reason: SDUPTHER

## 2020-06-23 RX ADMIN — FAMOTIDINE 40 MG: 20 TABLET ORAL at 20:15

## 2020-06-23 RX ADMIN — BUSPIRONE HYDROCHLORIDE 10 MG: 10 TABLET ORAL at 20:15

## 2020-06-23 RX ADMIN — SODIUM CHLORIDE: 9 INJECTION, SOLUTION INTRAVENOUS at 14:30

## 2020-06-23 RX ADMIN — Medication 50 MCG: at 14:40

## 2020-06-23 RX ADMIN — LIDOCAINE HYDROCHLORIDE 100 MG: 20 INJECTION, SOLUTION EPIDURAL; INFILTRATION; INTRACAUDAL; PERINEURAL at 14:40

## 2020-06-23 RX ADMIN — LIDOCAINE HYDROCHLORIDE 3 ML: 10 INJECTION, SOLUTION INFILTRATION; PERINEURAL at 18:05

## 2020-06-23 RX ADMIN — LABETALOL HYDROCHLORIDE 5 MG: 5 INJECTION, SOLUTION INTRAVENOUS at 15:12

## 2020-06-23 RX ADMIN — SODIUM CHLORIDE: 9 INJECTION, SOLUTION INTRAVENOUS at 15:48

## 2020-06-23 RX ADMIN — BUSPIRONE HYDROCHLORIDE 10 MG: 10 TABLET ORAL at 07:45

## 2020-06-23 RX ADMIN — ROPIVACAINE HYDROCHLORIDE 30 ML: 5 INJECTION, SOLUTION EPIDURAL; INFILTRATION; PERINEURAL at 18:05

## 2020-06-23 RX ADMIN — TRAZODONE HYDROCHLORIDE 100 MG: 100 TABLET ORAL at 20:15

## 2020-06-23 RX ADMIN — HYDROCHLOROTHIAZIDE 12.5 MG: 25 TABLET ORAL at 07:44

## 2020-06-23 RX ADMIN — ROCURONIUM BROMIDE 50 MG: 10 INJECTION, SOLUTION INTRAVENOUS at 14:40

## 2020-06-23 RX ADMIN — OXYBUTYNIN CHLORIDE 5 MG: 5 TABLET, EXTENDED RELEASE ORAL at 07:45

## 2020-06-23 RX ADMIN — CYANOCOBALAMIN TAB 1000 MCG 1000 MCG: 1000 TAB at 07:45

## 2020-06-23 RX ADMIN — CLINDAMYCIN PHOSPHATE 600 MG: 600 INJECTION, SOLUTION INTRAVENOUS at 23:54

## 2020-06-23 RX ADMIN — Medication 3 MG: at 17:29

## 2020-06-23 RX ADMIN — PROPOFOL 110 MG: 10 INJECTION, EMULSION INTRAVENOUS at 14:40

## 2020-06-23 RX ADMIN — SODIUM CHLORIDE: 9 INJECTION, SOLUTION INTRAVENOUS at 07:44

## 2020-06-23 RX ADMIN — Medication 100 MCG: at 16:32

## 2020-06-23 RX ADMIN — FLUTICASONE PROPIONATE 2 SPRAY: 50 SPRAY, METERED NASAL at 08:13

## 2020-06-23 RX ADMIN — MORPHINE SULFATE 2 MG: 2 INJECTION, SOLUTION INTRAMUSCULAR; INTRAVENOUS at 02:49

## 2020-06-23 RX ADMIN — MORPHINE SULFATE 4 MG: 4 INJECTION, SOLUTION INTRAMUSCULAR; INTRAVENOUS at 07:45

## 2020-06-23 RX ADMIN — Medication 500 ML: at 18:22

## 2020-06-23 RX ADMIN — LEVOTHYROXINE SODIUM 25 MCG: 0.03 TABLET ORAL at 07:44

## 2020-06-23 RX ADMIN — BUPROPION HYDROCHLORIDE 300 MG: 150 TABLET, EXTENDED RELEASE ORAL at 07:44

## 2020-06-23 RX ADMIN — CLINDAMYCIN PHOSPHATE 600 MG: 300 INJECTION, SOLUTION INTRAVENOUS at 14:58

## 2020-06-23 RX ADMIN — LABETALOL HYDROCHLORIDE 5 MG: 5 INJECTION, SOLUTION INTRAVENOUS at 17:44

## 2020-06-23 RX ADMIN — ONDANSETRON 4 MG: 2 INJECTION INTRAMUSCULAR; INTRAVENOUS at 07:45

## 2020-06-23 RX ADMIN — HYDRALAZINE HYDROCHLORIDE 10 MG: 20 INJECTION INTRAMUSCULAR; INTRAVENOUS at 03:06

## 2020-06-23 RX ADMIN — Medication 0.6 MG: at 17:29

## 2020-06-23 RX ADMIN — ONDANSETRON 4 MG: 2 INJECTION, SOLUTION INTRAMUSCULAR; INTRAVENOUS at 17:24

## 2020-06-23 RX ADMIN — Medication 50 MCG: at 15:02

## 2020-06-23 ASSESSMENT — PULMONARY FUNCTION TESTS
PIF_VALUE: 21
PIF_VALUE: 20
PIF_VALUE: 0
PIF_VALUE: 20
PIF_VALUE: 21
PIF_VALUE: 20
PIF_VALUE: 1
PIF_VALUE: 20
PIF_VALUE: 15
PIF_VALUE: 21
PIF_VALUE: 21
PIF_VALUE: 17
PIF_VALUE: 1
PIF_VALUE: 20
PIF_VALUE: 1
PIF_VALUE: 3
PIF_VALUE: 1
PIF_VALUE: 20
PIF_VALUE: 21
PIF_VALUE: 20
PIF_VALUE: 5
PIF_VALUE: 21
PIF_VALUE: 20
PIF_VALUE: 21
PIF_VALUE: 20
PIF_VALUE: 21
PIF_VALUE: 15
PIF_VALUE: 21
PIF_VALUE: 21
PIF_VALUE: 20
PIF_VALUE: 21
PIF_VALUE: 20
PIF_VALUE: 21
PIF_VALUE: 19
PIF_VALUE: 18
PIF_VALUE: 21
PIF_VALUE: 2
PIF_VALUE: 1
PIF_VALUE: 16
PIF_VALUE: 15
PIF_VALUE: 21
PIF_VALUE: 20
PIF_VALUE: 4
PIF_VALUE: 21
PIF_VALUE: 15
PIF_VALUE: 21
PIF_VALUE: 1
PIF_VALUE: 20
PIF_VALUE: 20
PIF_VALUE: 19
PIF_VALUE: 20
PIF_VALUE: 21
PIF_VALUE: 20
PIF_VALUE: 20
PIF_VALUE: 2
PIF_VALUE: 1
PIF_VALUE: 20
PIF_VALUE: 20
PIF_VALUE: 21
PIF_VALUE: 20
PIF_VALUE: 21
PIF_VALUE: 20
PIF_VALUE: 21
PIF_VALUE: 20
PIF_VALUE: 16
PIF_VALUE: 15
PIF_VALUE: 21
PIF_VALUE: 20
PIF_VALUE: 21
PIF_VALUE: 2
PIF_VALUE: 20
PIF_VALUE: 21
PIF_VALUE: 21
PIF_VALUE: 20
PIF_VALUE: 21
PIF_VALUE: 20
PIF_VALUE: 21
PIF_VALUE: 21
PIF_VALUE: 20
PIF_VALUE: 21
PIF_VALUE: 20
PIF_VALUE: 21
PIF_VALUE: 20
PIF_VALUE: 12
PIF_VALUE: 20
PIF_VALUE: 20
PIF_VALUE: 21
PIF_VALUE: 12
PIF_VALUE: 21
PIF_VALUE: 20
PIF_VALUE: 21
PIF_VALUE: 3
PIF_VALUE: 15
PIF_VALUE: 19
PIF_VALUE: 20
PIF_VALUE: 21
PIF_VALUE: 3
PIF_VALUE: 7
PIF_VALUE: 15
PIF_VALUE: 21
PIF_VALUE: 1
PIF_VALUE: 20
PIF_VALUE: 21
PIF_VALUE: 20
PIF_VALUE: 21
PIF_VALUE: 21
PIF_VALUE: 20
PIF_VALUE: 21
PIF_VALUE: 20
PIF_VALUE: 21
PIF_VALUE: 20
PIF_VALUE: 20
PIF_VALUE: 21
PIF_VALUE: 20
PIF_VALUE: 20
PIF_VALUE: 14
PIF_VALUE: 31
PIF_VALUE: 21
PIF_VALUE: 20
PIF_VALUE: 21
PIF_VALUE: 21
PIF_VALUE: 3
PIF_VALUE: 20
PIF_VALUE: 21
PIF_VALUE: 21
PIF_VALUE: 20
PIF_VALUE: 20
PIF_VALUE: 21
PIF_VALUE: 21
PIF_VALUE: 20
PIF_VALUE: 21
PIF_VALUE: 20
PIF_VALUE: 21
PIF_VALUE: 20
PIF_VALUE: 21
PIF_VALUE: 20
PIF_VALUE: 21
PIF_VALUE: 21
PIF_VALUE: 20
PIF_VALUE: 20
PIF_VALUE: 21
PIF_VALUE: 20
PIF_VALUE: 2
PIF_VALUE: 2
PIF_VALUE: 21
PIF_VALUE: 20
PIF_VALUE: 26
PIF_VALUE: 2
PIF_VALUE: 20
PIF_VALUE: 21
PIF_VALUE: 20
PIF_VALUE: 21
PIF_VALUE: 21
PIF_VALUE: 20
PIF_VALUE: 21
PIF_VALUE: 20
PIF_VALUE: 20
PIF_VALUE: 22
PIF_VALUE: 20
PIF_VALUE: 21
PIF_VALUE: 1

## 2020-06-23 ASSESSMENT — PAIN SCALES - GENERAL
PAINLEVEL_OUTOF10: 8
PAINLEVEL_OUTOF10: 0
PAINLEVEL_OUTOF10: 2
PAINLEVEL_OUTOF10: 0

## 2020-06-23 ASSESSMENT — PAIN DESCRIPTION - FREQUENCY: FREQUENCY: INTERMITTENT

## 2020-06-23 ASSESSMENT — PAIN DESCRIPTION - ONSET: ONSET: GRADUAL

## 2020-06-23 ASSESSMENT — PAIN DESCRIPTION - DESCRIPTORS: DESCRIPTORS: DISCOMFORT;TENDER;THROBBING

## 2020-06-23 ASSESSMENT — PAIN DESCRIPTION - PROGRESSION: CLINICAL_PROGRESSION: NOT CHANGED

## 2020-06-23 ASSESSMENT — PAIN DESCRIPTION - PAIN TYPE: TYPE: ACUTE PAIN

## 2020-06-23 ASSESSMENT — PAIN - FUNCTIONAL ASSESSMENT: PAIN_FUNCTIONAL_ASSESSMENT: PREVENTS OR INTERFERES WITH MANY ACTIVE NOT PASSIVE ACTIVITIES

## 2020-06-23 ASSESSMENT — PAIN DESCRIPTION - ORIENTATION: ORIENTATION: LEFT

## 2020-06-23 ASSESSMENT — PAIN DESCRIPTION - LOCATION: LOCATION: FOOT

## 2020-06-23 NOTE — PROGRESS NOTES
[facility]    Progress Note    6/23/2020    2:57 PM    Name:   Maddi Euceda  MRN:     [id number]     [place]:      [de-identified]   Room:   [facility] OR [place]/NONE  IP Day:  2  Admit Date:  6/21/2020  8:34 PM    PCP:   Thom Montiel DO  Code Status:  Full Code    Subjective:     C/C:   Chief Complaint   Patient presents with   Ngoc Belts    Ankle Pain     Interval History Status:    Patient is n.p.o. for anticipated left ankle surgery for trimalleolar fracture of left ankle  COVID test has been negative  Complains of postnasal discharge for which she uses nasal sprays and incentive spirometry  Brief History:   Maddi Euceda is a 82 y.o. Non-/non  female who presents with Fall and Ankle Pain   and is admitted to the hospital for the management of Closed trimalleolar fracture of left ankle.     The patient reports to the hospital with complaint of fall and left ankle injury. The patient states that she was going down her stairs when she missed stepped and fell. She denies hitting her head or loss of consciousness. She does endorse an injury to her left ankle. She states she was unable to bear weight on it after the fall. She endorses pain to the area that she describes as burning and 9/10 in intensity. She states her pain is aggravated with movement and has been greatly improved with administered analgesics. Orthopedic surgery was consulted in the ER, no immediate plan for surgical intervention. The patient failed ambulation trial in the ER with the use of crutches.  She has past medical history that includes both thyroidism, hypertension, anemia and GERD.     X-ray left knee indicates no acute osseous abnormality of the left knee.     X-ray left ankle indicates acute trimalleolar fracture of the left ankle with lateral talar tilt and posterior talar subluxation.         Review of Systems:     Constitutional:  negative for chills, fevers, sweats  Respiratory:  + Postnasal discharge, negative for cough, dyspnea on exertion, shortness of breath, wheezing  Cardiovascular:  negative for chest pain, chest pressure/discomfort, lower extremity edema, palpitations  Gastrointestinal:  negative for abdominal pain, constipation, diarrhea, nausea, vomiting  Neurological:  negative for dizziness, headache    Medications: Allergies:     Allergies   Allergen Reactions    Ampicillin Anaphylaxis    Erythromycin Anaphylaxis    Doxycycline Itching and Anxiety    Paroxetine Other (See Comments)     Clinching of hands    Paroxetine Hcl Other (See Comments)     Clinching of hands    Penicillins Other (See Comments)     swelling    Sulfa Antibiotics Other (See Comments) and Rash       Current Meds:   Scheduled Meds:    [MAR Hold] levothyroxine  25 mcg Oral Daily    [MAR Hold] oxybutynin  5 mg Oral Daily    [MAR Hold] sodium chloride flush  10 mL Intravenous 2 times per day    [MAR Hold] enoxaparin  40 mg Subcutaneous Daily    [MAR Hold] buPROPion  300 mg Oral QAM    [MAR Hold] busPIRone  10 mg Oral BID    [MAR Hold] fluticasone  2 spray Nasal Daily    [MAR Hold] traZODone  100 mg Oral Nightly    [MAR Hold] hydroCHLOROthiazide  12.5 mg Oral Daily    [MAR Hold] famotidine  40 mg Oral Nightly    [MAR Hold] vitamin B-12  1,000 mcg Oral Daily     Continuous Infusions:    [MAR Hold] sodium chloride 75 mL/hr at 06/23/20 0744     PRN Meds: [MAR Hold] hydrALAZINE, [MAR Hold] fentanNYL, [MAR Hold] fentanNYL, [MAR Hold] HYDROmorphone, [MAR Hold] HYDROmorphone, [MAR Hold] ondansetron, [MAR Hold] sodium chloride flush, [MAR Hold] potassium chloride **OR** [MAR Hold] potassium alternative oral replacement **OR** [MAR Hold] potassium chloride, [MAR Hold] magnesium sulfate, [MAR Hold] acetaminophen **OR** [MAR Hold] acetaminophen, [MAR Hold] polyethylene glycol, [MAR Hold] promethazine **OR** [MAR Hold] ondansetron, [MAR Hold] nicotine, [MAR Hold] HYDROcodone 5 mg -

## 2020-06-23 NOTE — PLAN OF CARE
Problem: Falls - Risk of:  Goal: Will remain free from falls  Description: Will remain free from falls  6/23/2020 1948 by Deniz Harp RN  Outcome: Ongoing     Problem: Falls - Risk of:  Goal: Absence of physical injury  Description: Absence of physical injury  Outcome: Ongoing     Problem: Pain:  Goal: Pain level will decrease  Description: Pain level will decrease  6/23/2020 1948 by Deniz Harp RN  Outcome: Ongoing     Problem: Pain:  Goal: Control of acute pain  Description: Control of acute pain  Outcome: Ongoing     Problem: Pain:  Goal: Control of chronic pain  Description: Control of chronic pain  Outcome: Ongoing     Problem: DAILY CARE  Goal: Daily care needs are met  6/23/2020 1948 by Deniz Harp RN  Outcome: Ongoing     Problem: SKIN INTEGRITY  Goal: Skin integrity is maintained or improved  6/23/2020 1948 by Deniz Harp RN  Outcome: Ongoing     Problem: KNOWLEDGE DEFICIT  Goal: Patient/S.O. demonstrates understanding of disease process, treatment plan, medications, and discharge instructions.   6/23/2020 1948 by Deniz Harp RN  Outcome: Ongoing     Problem: DISCHARGE BARRIERS  Goal: Patient's continuum of care needs are met  6/23/2020 1948 by Deniz Harp RN  Outcome: Ongoing     Problem: Skin Integrity:  Goal: Will show no infection signs and symptoms  Description: Will show no infection signs and symptoms  Outcome: Ongoing     Problem: Skin Integrity:  Goal: Absence of new skin breakdown  Description: Absence of new skin breakdown  Outcome: Ongoing

## 2020-06-23 NOTE — ANESTHESIA PRE PROCEDURE
Department of Anesthesiology  Preprocedure Note       Name:  Kit Hoff   Age:  82 y.o.  :  1935                                          MRN:  [id number]         Date:  2020      Surgeon: Loc Sylvester):  Willian Allen MD    Procedure: Procedure(s):  LEFT ANKLE  ORIF - OSWALDO    Medications prior to admission:   Prior to Admission medications    Medication Sig Start Date End Date Taking? Authorizing Provider   famotidine (PEPCID) 40 MG tablet Take 40 mg by mouth nightly   Yes Historical Provider, MD   pantoprazole (PROTONIX) 40 MG tablet Take 40 mg by mouth every morning   Yes Historical Provider, MD   vitamin B-12 (CYANOCOBALAMIN) 1000 MCG tablet Take 1,000 mcg by mouth daily   Yes Historical Provider, MD   traMADol (ULTRAM) 50 MG tablet Take 1 tablet by mouth every 6 hours as needed for Pain for up to 3 days. Intended supply: 3 days. Take lowest dose possible to manage pain 20 Yes Treasure Zaragoza MD   hydrochlorothiazide (HYDRODIURIL) 25 MG tablet take 1 tablet by mouth once daily 17  Yes FERNANDO Davis CNP   levothyroxine (SYNTHROID) 25 MCG tablet take 1 tablet by mouth once daily 8/3/17  Yes FERNANDO Davis CNP   oxybutynin (DITROPAN XL) 5 MG CR tablet Take 1 tablet by mouth daily 16 Yes FERNANDO Davis CNP   ALPRAZolam Haroldine Dallas) 0.5 MG tablet Take 0.5 mg by mouth daily as needed for Anxiety.   11/15/15  Yes Historical Provider, MD   busPIRone (BUSPAR) 10 MG tablet Take 10 mg by mouth 2 times daily  11/6/15  Yes Historical Provider, MD   triamcinolone (KENALOG) 0.1 % cream 2 times daily as needed (ezcema)  10/2/15  Yes Historical Provider, MD   buPROPion (WELLBUTRIN XL) 300 MG XL tablet Take 300 mg by mouth every morning  4/24/15  Yes Historical Provider, MD   traZODone (DESYREL) 100 MG tablet Take 100 mg by mouth nightly  4/24/15  Yes Historical Provider, MD   fluticasone (FLONASE) 50 MCG/ACT nasal spray 2 sprays by Nasal route daily 7/28/15 hands    Paroxetine Hcl Other (See Comments)     Clinching of hands    Penicillins Other (See Comments)     swelling    Sulfa Antibiotics Other (See Comments) and Rash       Problem List:    Patient Active Problem List   Diagnosis Code    Acquired hypothyroidism E03.9    Dyslipidemia E78.5    Gastroesophageal reflux disease with esophagitis K21.0    Closed trimalleolar fracture of left ankle S82.852A    Fall at home, initial encounter W19. Gaviota Gardner, Y92.009    Essential hypertension I10    Anemia, normocytic normochromic D64.9       Past Medical History:        Diagnosis Date    Anemia     Anxiety     Depression     GERD (gastroesophageal reflux disease)     Hypertension     Hypothyroidism     Insomnia     PTSD (post-traumatic stress disorder)     Vitiligo        Past Surgical History:        Procedure Laterality Date    DILATION AND CURETTAGE OF UTERUS      HYSTERECTOMY      THYROID SURGERY      remove of tumor       Social History:    Social History     Tobacco Use    Smoking status: Never Smoker    Smokeless tobacco: Never Used   Substance Use Topics    Alcohol use: Yes     Comment: occas                                Counseling given: Not Answered      Vital Signs (Current):   Vitals:    06/23/20 0230 06/23/20 0330 06/23/20 0412 06/23/20 0753   BP: (!) 186/58 (!) 154/48  (!) 159/51   Pulse:    67   Resp:    16   Temp:    99 °F (37.2 °C)   TempSrc:    Oral   SpO2:    93%   Weight:   146 lb (66.2 kg)    Height:                                                  BP Readings from Last 3 Encounters:   06/23/20 (!) 159/51   01/17/17 (!) 142/67   10/06/16 126/78       NPO Status:                                                                                 BMI:   Wt Readings from Last 3 Encounters:   06/23/20 146 lb (66.2 kg)   01/17/17 125 lb 3.2 oz (56.8 kg)   10/06/16 127 lb (57.6 kg)     Body mass index is 24.3 kg/m².     CBC:   Lab Results   Component Value Date    WBC 7.7 06/22/2020    RBC

## 2020-06-23 NOTE — OP NOTE
[place] [address]  Dept: [phone]: [phone]      Orthopedic Surgery Operative Report      Patient: Curt Mcarthur      MRN#: [id number]     YOB: 1935      Date of Surgery: 6/21/2020    Attending Surgeon: Brendan Schmidt M.D.   PCP: Barrie Stewart DO        Preoperative Diagnosis:   1. Left closed ankle fracture (trimalleolar ankle fracture-dislocation)  2. Left distal tibia varus malunion  3. Hypothyroidism    Postoperative Diagnosis:   1. Same as above    Procedures Performed:  (6/23/2020)  1. Left ankle open reduction internal fixation of trimalleolar ankle fracture (medial, lateral, and posterior malleoli)      Implants:    Implant Name Type Inv.  Item Serial No.  Lot No. LRB No. Used Action   SCREW LK T10 FLTHRD 3.5X12MM Screw/Plate/Nail/Yaakov SCREW LK T10 FLTHRD 3.5X12MM  OSWALDO: [employer]  Left 1 Implanted   SCREW LK 16MM T10 FULL Screw/Plate/Nail/Yaakov SCREW LK 16MM T10 FULL  OSWALDO: ORTH[employer]  Left 2 Implanted   OSWALDO 3.5MM LOCKING SCREW       Left 1 Implanted   STRKER 3.5MM LOCKING SCREW 36MM     [id number] Left 1 Implanted   SCREW BONE T10 NON LK 3.5X16MM Screw/Plate/Nail/Yaakov SCREW BONE T10 NON LK 3.5X16MM  OSWALDO: ORTH[employer]  Left 3 Implanted   OSWALDO 3.5MM NONLOCKING SCREWS 20MM      Left 1 Implanted   STYKER 3.5MM NON LOCKINH 26MM      Left 1 Implanted   SCREW BONE T10 FLTHRD 3.5X30MM Screw/Plate/Nail/Yaakov SCREW BONE T10 FLTHRD 3.5X30MM  OSWALDO: ORTHOP[employer]  Left 2 Implanted   OSWALDO 3.5MM NON LOCKING SCREW 40MM      Left 1 Implanted   PLATE BROAD Y6 SHAFT HL Screw/Plate/Nail/Yaakov PLATE BROAD Y6 SHAFT HL  OSWALDO: ORTHOPA[employer]  Left 1 Implanted   PLATE DISTAL LAT FIBULA 4 HOLE Screw/Plate/Nail/Yaakov PLATE DISTAL LAT FIBULA 4 HOLE  OSWALDO: ORTHOPA[employer]  Left 1 Implanted   SCREW MELISA PTHRD ASNIS III 4.0X50MM Screw/Plate/Nail/Yaakov SCREW MELISA PTHRD ASNIS III 4.0X50MM  OSWALDO: ORTHOPAEDICS  Left 1 Implanted

## 2020-06-24 VITALS
WEIGHT: 146.2 LBS | BODY MASS INDEX: 24.36 KG/M2 | HEIGHT: 65 IN | DIASTOLIC BLOOD PRESSURE: 55 MMHG | RESPIRATION RATE: 15 BRPM | HEART RATE: 78 BPM | SYSTOLIC BLOOD PRESSURE: 140 MMHG | TEMPERATURE: 97.9 F | OXYGEN SATURATION: 91 %

## 2020-06-24 LAB
HCT VFR BLD CALC: 37.8 % (ref 36.3–47.1)
HEMOGLOBIN: 12 G/DL (ref 11.9–15.1)

## 2020-06-24 PROCEDURE — 97162 PT EVAL MOD COMPLEX 30 MIN: CPT

## 2020-06-24 PROCEDURE — 97530 THERAPEUTIC ACTIVITIES: CPT

## 2020-06-24 PROCEDURE — 85014 HEMATOCRIT: CPT

## 2020-06-24 PROCEDURE — 99239 HOSP IP/OBS DSCHRG MGMT >30: CPT | Performed by: INTERNAL MEDICINE

## 2020-06-24 PROCEDURE — 6360000002 HC RX W HCPCS: Performed by: ORTHOPAEDIC SURGERY

## 2020-06-24 PROCEDURE — 6370000000 HC RX 637 (ALT 250 FOR IP): Performed by: ORTHOPAEDIC SURGERY

## 2020-06-24 PROCEDURE — 97535 SELF CARE MNGMENT TRAINING: CPT

## 2020-06-24 PROCEDURE — 99024 POSTOP FOLLOW-UP VISIT: CPT | Performed by: ORTHOPAEDIC SURGERY

## 2020-06-24 PROCEDURE — 36415 COLL VENOUS BLD VENIPUNCTURE: CPT

## 2020-06-24 PROCEDURE — 85018 HEMOGLOBIN: CPT

## 2020-06-24 PROCEDURE — 97110 THERAPEUTIC EXERCISES: CPT

## 2020-06-24 PROCEDURE — 97116 GAIT TRAINING THERAPY: CPT

## 2020-06-24 PROCEDURE — 97166 OT EVAL MOD COMPLEX 45 MIN: CPT

## 2020-06-24 PROCEDURE — 2580000003 HC RX 258: Performed by: ORTHOPAEDIC SURGERY

## 2020-06-24 PROCEDURE — 2500000003 HC RX 250 WO HCPCS: Performed by: ORTHOPAEDIC SURGERY

## 2020-06-24 RX ORDER — ONDANSETRON 4 MG/1
4 TABLET, FILM COATED ORAL EVERY 12 HOURS PRN
Qty: 20 TABLET | Refills: 0 | Status: SHIPPED | OUTPATIENT
Start: 2020-06-24

## 2020-06-24 RX ORDER — HYDROCODONE BITARTRATE AND ACETAMINOPHEN 5; 325 MG/1; MG/1
1 TABLET ORAL EVERY 6 HOURS PRN
Qty: 10 TABLET | Refills: 0 | Status: SHIPPED | OUTPATIENT
Start: 2020-06-24 | End: 2020-06-29

## 2020-06-24 RX ORDER — ASPIRIN 325 MG
325 TABLET, DELAYED RELEASE (ENTERIC COATED) ORAL DAILY
Qty: 42 TABLET | Refills: 0 | Status: SHIPPED | OUTPATIENT
Start: 2020-06-24 | End: 2020-08-05

## 2020-06-24 RX ORDER — ALPRAZOLAM 0.5 MG/1
0.5 TABLET ORAL DAILY PRN
Qty: 5 TABLET | Refills: 0 | Status: SHIPPED | OUTPATIENT
Start: 2020-06-24 | End: 2020-06-28

## 2020-06-24 RX ORDER — CETIRIZINE HYDROCHLORIDE 5 MG/1
5 TABLET ORAL DAILY
Qty: 15 TABLET | Refills: 0 | Status: SHIPPED | OUTPATIENT
Start: 2020-06-25 | End: 2020-07-10

## 2020-06-24 RX ORDER — OXYCODONE HYDROCHLORIDE AND ACETAMINOPHEN 5; 325 MG/1; MG/1
1 TABLET ORAL EVERY 6 HOURS PRN
Qty: 20 TABLET | Refills: 0 | Status: SHIPPED | OUTPATIENT
Start: 2020-06-24 | End: 2020-07-01

## 2020-06-24 RX ORDER — DOCUSATE SODIUM 100 MG/1
100 CAPSULE, LIQUID FILLED ORAL 2 TIMES DAILY PRN
Qty: 20 CAPSULE | Refills: 0 | Status: SHIPPED | OUTPATIENT
Start: 2020-06-24

## 2020-06-24 RX ADMIN — ENOXAPARIN SODIUM 40 MG: 40 INJECTION SUBCUTANEOUS at 09:00

## 2020-06-24 RX ADMIN — HYDROCODONE BITARTRATE AND ACETAMINOPHEN 1 TABLET: 5; 325 TABLET ORAL at 14:28

## 2020-06-24 RX ADMIN — BUSPIRONE HYDROCHLORIDE 10 MG: 10 TABLET ORAL at 09:00

## 2020-06-24 RX ADMIN — CYANOCOBALAMIN TAB 1000 MCG 1000 MCG: 1000 TAB at 09:00

## 2020-06-24 RX ADMIN — OXYBUTYNIN CHLORIDE 5 MG: 5 TABLET, EXTENDED RELEASE ORAL at 08:57

## 2020-06-24 RX ADMIN — SODIUM CHLORIDE, PRESERVATIVE FREE 10 ML: 5 INJECTION INTRAVENOUS at 09:00

## 2020-06-24 RX ADMIN — FLUTICASONE PROPIONATE 2 SPRAY: 50 SPRAY, METERED NASAL at 09:03

## 2020-06-24 RX ADMIN — LEVOTHYROXINE SODIUM 25 MCG: 0.03 TABLET ORAL at 06:38

## 2020-06-24 RX ADMIN — BUPROPION HYDROCHLORIDE 300 MG: 150 TABLET, EXTENDED RELEASE ORAL at 08:59

## 2020-06-24 RX ADMIN — HYDROCHLOROTHIAZIDE 12.5 MG: 25 TABLET ORAL at 08:59

## 2020-06-24 RX ADMIN — CETIRIZINE HYDROCHLORIDE 5 MG: 5 TABLET, FILM COATED ORAL at 08:57

## 2020-06-24 RX ADMIN — CLINDAMYCIN PHOSPHATE 600 MG: 600 INJECTION, SOLUTION INTRAVENOUS at 06:38

## 2020-06-24 RX ADMIN — SODIUM CHLORIDE: 9 INJECTION, SOLUTION INTRAVENOUS at 09:05

## 2020-06-24 ASSESSMENT — PAIN - FUNCTIONAL ASSESSMENT: PAIN_FUNCTIONAL_ASSESSMENT: PREVENTS OR INTERFERES SOME ACTIVE ACTIVITIES AND ADLS

## 2020-06-24 ASSESSMENT — PAIN DESCRIPTION - PROGRESSION: CLINICAL_PROGRESSION: GRADUALLY WORSENING

## 2020-06-24 ASSESSMENT — PAIN DESCRIPTION - ONSET: ONSET: ON-GOING

## 2020-06-24 ASSESSMENT — PAIN DESCRIPTION - PAIN TYPE: TYPE: SURGICAL PAIN

## 2020-06-24 ASSESSMENT — PAIN DESCRIPTION - ORIENTATION: ORIENTATION: LEFT

## 2020-06-24 ASSESSMENT — PAIN DESCRIPTION - FREQUENCY: FREQUENCY: INTERMITTENT

## 2020-06-24 ASSESSMENT — PAIN SCALES - GENERAL
PAINLEVEL_OUTOF10: 2
PAINLEVEL_OUTOF10: 0
PAINLEVEL_OUTOF10: 0
PAINLEVEL_OUTOF10: 4

## 2020-06-24 ASSESSMENT — PAIN DESCRIPTION - DESCRIPTORS: DESCRIPTORS: DISCOMFORT;NUMBNESS

## 2020-06-24 ASSESSMENT — PAIN DESCRIPTION - LOCATION: LOCATION: ANKLE

## 2020-06-24 NOTE — PLAN OF CARE
Problem: Falls - Risk of:  Goal: Will remain free from falls  Description: Will remain free from falls  Outcome: Ongoing  Note: Siderails up x 2  Hourly rounding  Call light in reach  Instructed to call for assist before attempting out of bed. Remains free from falls and accidental injury at this time   Floor free from obstacles  Bed is locked and in lowest position  Adequate lighting provided  Bed alarm on, Red [place] star and Stay with Me signs posted      Goal: Absence of physical injury  Description: Absence of physical injury  Outcome: Ongoing     Problem: Pain:  Description: Pain management should include both nonpharmacologic and pharmacologic interventions. Goal: Pain level will decrease  Description: Pain level will decrease  Outcome: Ongoing  Note: Pain level assessment complete and patient denies any pain upon assessment. Patient educated on pain scale and control interventions  No PRN pain medication given   Patient instructed to call out with new onset of pain or unrelieved pain    Goal: Control of acute pain  Description: Control of acute pain  Outcome: Ongoing  Goal: Control of chronic pain  Description: Control of chronic pain  Outcome: Ongoing     Problem: DAILY CARE  Goal: Daily care needs are met  Outcome: Ongoing     Problem: SKIN INTEGRITY  Goal: Skin integrity is maintained or improved  Outcome: Ongoing     Problem: KNOWLEDGE DEFICIT  Goal: Patient/S.O. demonstrates understanding of disease process, treatment plan, medications, and discharge instructions.   Outcome: Ongoing     Problem: DISCHARGE BARRIERS  Goal: Patient's continuum of care needs are met  Outcome: Ongoing     Problem: Skin Integrity:  Goal: Will show no infection signs and symptoms  Description: Will show no infection signs and symptoms  Outcome: Ongoing  Goal: Absence of new skin breakdown  Description: Absence of new skin breakdown  Outcome: Ongoing

## 2020-06-24 NOTE — PROGRESS NOTES
Updated AVS faxed to [facility] [place]. Patient discharged via [facility] [address] with all her belongings to [facility] [place] in stable condition.  Patient understood and signed AVS.

## 2020-06-24 NOTE — PROGRESS NOTES
Pumps: 10 Rt. LE     Plan   Plan  Times per week: 1-2x/day; 5-6days/wk  Specific instructions for Next Treatment: progress gait/ balance NWB LLE  Current Treatment Recommendations: Strengthening, Functional Mobility Training, ROM, Transfer Training, Balance Training, Patient/Caregiver Education & Training, Safety Education & Training, Home Exercise Program, Endurance Training, Gait Training  Safety Devices  Type of devices: All fall risk precautions in place, Gait belt, Bed alarm in place, Patient at risk for falls, Call light within reach, Left in bed, Nurse notified(Informed RN of no chair alarm and that pt. aware of need to call for assistance/ RN OK)      Goals  Short term goals  Time Frame for Short term goals: 12 visits:  Short term goal 1: Pt. to be SBA for bed mob. Short term goal 2: Pt. to be CGA for sit to stand transfer with RW, NWB LLE  Short term goal 3: Pt. to amb. 45ft. with RW, NWB LLE, CGA  Short term goal 4: Pt. to tolerate 25+ min. of PT for functional mob. training/ gait/ balance training/ gentle ther ex  (NWB LLE)  Patient Goals   Patient goals : ambulate       Therapy Time   Individual Concurrent Group Co-treatment   Time In 0915         Time Out 1022         Minutes 67              Treatment time: 48 min  Co-treatment with OT warranted secondary to decreased safety and independence requiring 2 skilled therapy professionals to address individual discipline's goals.       Nena Aguilera, PT

## 2020-06-24 NOTE — PLAN OF CARE
Nutrition Problem: Increased nutrient needs  Intervention: Food and/or Nutrient Delivery: Continue current diet, Start ONS  Nutritional Goals: PO intake to meet >75% of estimated energy and protein needs

## 2020-06-24 NOTE — DISCHARGE SUMMARY
DO Sid  [id number]  [facility] [address]  [facility] , [place] [address] [address]  [phone]    Go on 7/10/2020  Appointment 7-10-20 at 8am       Requiring Further Evaluation/Follow Up POST HOSPITALIZATION/Incidental Findings: PT OT    Diet: cardiac diet    Activity: As tolerated    Instructions to Patient: Weightbearing as recommended by orthopedics    Discharge Medications:      Medication List      START taking these medications    cetirizine 5 MG tablet  Commonly known as:  ZYRTEC  Take 1 tablet by mouth daily for 15 days  Start taking on:  June 25, 2020     enoxaparin 40 MG/0.4ML injection  Commonly known as:  LOVENOX  Inject 0.4 mLs into the skin daily for 5 days  Start taking on:  June 25, 2020     HYDROcodone-acetaminophen 5-325 MG per tablet  Commonly known as:  NORCO  Take 1 tablet by mouth every 6 hours as needed for Pain for up to 10 doses. CHANGE how you take these medications    busPIRone 10 MG tablet  Commonly known as:  BUSPAR  What changed:  Another medication with the same name was removed. Continue taking this medication, and follow the directions you see here. famotidine 40 MG tablet  Commonly known as:  PEPCID  What changed:  Another medication with the same name was removed. Continue taking this medication, and follow the directions you see here. CONTINUE taking these medications    ALPRAZolam 0.5 MG tablet  Commonly known as:  XANAX  Take 1 tablet by mouth daily as needed for Anxiety for up to 5 doses.      buPROPion 300 MG extended release tablet  Commonly known as:  WELLBUTRIN XL     fluticasone 50 MCG/ACT nasal spray  Commonly known as:  Flonase  2 sprays by Nasal route daily     hydroCHLOROthiazide 25 MG tablet  Commonly known as:  HYDRODIURIL  take 1 tablet by mouth once daily     levothyroxine 25 MCG tablet  Commonly known as:  SYNTHROID  take 1 tablet by mouth once daily     oxybutynin 5 MG extended release

## 2020-06-24 NOTE — PROGRESS NOTES
Postnasal discharge, negative for cough, dyspnea on exertion, shortness of breath, wheezing  Cardiovascular:  negative for chest pain, chest pressure/discomfort, lower extremity edema, palpitations  Gastrointestinal:  negative for abdominal pain, constipation, diarrhea, nausea, vomiting  Neurological:  negative for dizziness, headache    Medications: Allergies: Allergies   Allergen Reactions    Ampicillin Anaphylaxis    Erythromycin Anaphylaxis    Doxycycline Itching and Anxiety    Paroxetine Other (See Comments)     Clinching of hands    Paroxetine Hcl Other (See Comments)     Clinching of hands    Penicillins Other (See Comments)     swelling    Sulfa Antibiotics Other (See Comments) and Rash       Current Meds:   Scheduled Meds:    cetirizine  5 mg Oral Daily    levothyroxine  25 mcg Oral Daily    oxybutynin  5 mg Oral Daily    sodium chloride flush  10 mL Intravenous 2 times per day    enoxaparin  40 mg Subcutaneous Daily    buPROPion  300 mg Oral QAM    busPIRone  10 mg Oral BID    fluticasone  2 spray Nasal Daily    traZODone  100 mg Oral Nightly    hydroCHLOROthiazide  12.5 mg Oral Daily    famotidine  40 mg Oral Nightly    vitamin B-12  1,000 mcg Oral Daily     Continuous Infusions:    bupivacaine 0.125% 500 mL (06/23/20 1822)    sodium chloride 75 mL/hr at 06/23/20 0744     PRN Meds: hydrALAZINE, sodium chloride flush, potassium chloride **OR** potassium alternative oral replacement **OR** potassium chloride, magnesium sulfate, acetaminophen **OR** acetaminophen, polyethylene glycol, promethazine **OR** ondansetron, nicotine, HYDROcodone 5 mg - acetaminophen **OR** HYDROcodone 5 mg - acetaminophen, morphine **OR** morphine, ALPRAZolam    Data:     Past Medical History:   has a past medical history of Anemia, Anxiety, Depression, GERD (gastroesophageal reflux disease), Hypertension, Hypothyroidism, Insomnia, PTSD (post-traumatic stress disorder), and Vitiligo.     Social History: reports that she has never smoked. She has never used smokeless tobacco. She reports current alcohol use. She reports that she does not use drugs. Family History:   Family History   Problem Relation Age of Onset    Stroke Mother     High Blood Pressure Mother        Vitals:  BP (!) 135/45   Pulse 74   Temp 98.2 °F (36.8 °C) (Oral)   Resp 18   Ht 5' 5\" (1.651 m)   Wt 146 lb 3.2 oz (66.3 kg)   SpO2 90%   BMI 24.33 kg/m²   Temp (24hrs), Av.6 °F (36.4 °C), Min:96.8 °F (36 °C), Max:98.6 °F (37 °C)    No results for input(s): POCGLU in the last 72 hours. I/O (24Hr): Intake/Output Summary (Last 24 hours) at 2020 0809  Last data filed at 2020 4372  Gross per 24 hour   Intake 5206 ml   Output 1410 ml   Net 3796 ml       Labs:  Hematology:  Recent Labs     20  0527   WBC 7.7   RBC 3.88*   HGB 11.2*   HCT 35.3*   MCV 91.0   MCH 28.9   MCHC 31.7   RDW 14.6*      MPV 9.9   INR 1.0     Chemistry:  Recent Labs     20  0527      K 4.1      CO2 26   GLUCOSE 114*   BUN 17   CREATININE 0.79   ANIONGAP 10   LABGLOM >60   GFRAA >60   CALCIUM 8.6   No results for input(s): PROT, LABALBU, LABA1C, W2TGKXH, U2VMRBX, FT4, TSH, AST, ALT, LDH, GGT, ALKPHOS, LABGGT, BILITOT, BILIDIR, AMMONIA, AMYLASE, LIPASE, LACTATE, CHOL, HDL, LDLCHOLESTEROL, CHOLHDLRATIO, TRIG, VLDL, LQL71LL, PHENYTOIN, PHENYF, URICACID, POCGLU in the last 72 hours. ABG:No results found for: POCPH, PHART, PH, POCPCO2, XMG2JYI, PCO2, POCPO2, PO2ART, PO2, POCHCO3, MKO2AZA, HCO3, NBEA, PBEA, BEART, BE, THGBART, THB, FRG3CCW, CUHL7UKZ, V9WRFZIA, O2SAT, FIO2  No results found for: SPECIAL  No results found for: CULTURE    Radiology:  Xr Knee Left (3 Views)    Result Date: 2020  No acute osseous abnormality of the left knee. Xr Tibia Fibula Left (2 Views)    Result Date: 2020  Trimalleolar fracture dislocation left ankle.      Xr Ankle Left (min 3 Views)    Result Date: 2020  Acute trimalleolar

## 2020-06-24 NOTE — CARE COORDINATION
Social work: Writer informed patient ready for [employer] today. Patient to dc to [facility] [place] via [facility]  at [facility].  # for RN report: [phone]. Completed LIZZY will need faxed to [phone]. Informed RN and facility of dc time, agreeable to plan. HENS completed.

## 2020-06-24 NOTE — PROGRESS NOTES
Bearing: Non Weight Bearing  Position Activity Restriction  Other position/activity restrictions: fell one yr ago prior to this fall, 1 L O2 per NC, LUE IV, INFUBLOCK, LLE numb    Subjective   General  Chart Reviewed: Yes  Patient assessed for rehabilitation services?: Yes  Family / Caregiver Present: No  Patient Currently in Pain: Yes  Pre Treatment Pain Screening  Intervention List: Nurse/Physician notified  Comments / Details: Pt states her LLE hurts and no rate was given; RN was in room and aware. Social/Functional History  Social/Functional History  Lives With: Alone  Type of Home: House(CONDO )  Home Layout: Able to Live on Main level with bedroom/bathroom, Two level(laundry and full bath on main )  Home Access: Stairs to enter without rails  Entrance Stairs - Number of Steps: 1  Bathroom Shower/Tub: Walk-in shower  Bathroom Toilet: Standard  Bathroom Equipment: Built-in shower seat  Home Equipment: [employer] Help From: Family, Friend(s), Neighbor(Pt's states her dtr lives close and is supportive )  ADL Assistance: Independent  Homemaking Assistance: Independent  Homemaking Responsibilities: Yes  Ambulation Assistance: Independent  Transfer Assistance: Independent  Active : Yes  Occupation: Retired  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 does own errands/grocery shopping        Objective   Vision: Within Functional Limits(Pt denies visual changes )  Vision Exceptions: Wears glasses at all times  Hearing: Exceptions to [facility]  Hearing Exceptions: Hard of hearing/hearing concerns    Orientation  Overall Orientation Status: Within Functional Limits  Observation/Palpation  Posture: Good  Observation: 1L O2; L ankle immobilized with half cast/splint with ace wrap; O2 sats at 89-90% at times with activity/room air; [address] with O2 on per NC.   Pt is impulsive and needs cues for

## 2020-06-24 NOTE — PROGRESS NOTES
To clarify her postop meds:  I ordered the patient 20 tablets of Percocet, 6 weeks of daily aspirin 325), and docusate and ondansetron. She only needs to take one anticoagulant (Aspirin or Lovenox), but not both. For pain, she should also only take one med (Percocet or Hydrocodone), but not both.

## 2020-07-10 ENCOUNTER — OFFICE VISIT (OUTPATIENT)
Dept: ORTHOPEDIC SURGERY | Age: 85
End: 2020-07-10

## 2020-07-10 VITALS
TEMPERATURE: 98.4 F | DIASTOLIC BLOOD PRESSURE: 83 MMHG | HEIGHT: 65 IN | BODY MASS INDEX: 24.35 KG/M2 | WEIGHT: 146.16 LBS | HEART RATE: 79 BPM | SYSTOLIC BLOOD PRESSURE: 137 MMHG

## 2020-07-10 PROCEDURE — 99024 POSTOP FOLLOW-UP VISIT: CPT | Performed by: ORTHOPAEDIC SURGERY

## 2020-07-10 RX ORDER — ASPIRIN 325 MG
325 TABLET, DELAYED RELEASE (ENTERIC COATED) ORAL DAILY
Qty: 42 TABLET | Refills: 0 | Status: SHIPPED | OUTPATIENT
Start: 2020-07-10 | End: 2020-08-21

## 2020-07-10 RX ORDER — ALPRAZOLAM 0.25 MG/1
0.25 TABLET ORAL 2 TIMES DAILY PRN
COMMUNITY

## 2020-07-10 NOTE — PROGRESS NOTES
[facility]  [address] [address]  Dept: [phone]    Ambulatory Orthopedic Postoperative Visit     Preoperative Diagnosis:   1. Left closed ankle fracture (trimalleolar ankle fracture-dislocation)  2. Left distal tibia varus malunion  3. Hypothyroidism     Postoperative Diagnosis:   1. Same as above     Procedures Performed:  (6/23/2020)  1. Left ankle open reduction internal fixation of trimalleolar ankle fracture (medial, lateral, and posterior malleoli)      SUBJECTIVE:     The patient returns for routine check 2 weeks post op from the above stated procedure. Reports doing well overall, reports improved pain, denies wound drainage/issues, fevers/chills/night sweats, calf swelling/pain, chest pain, shortness of breath. No complaints. In a SNF. OBJECTIVE:  /83   Pulse 79   Temp 98.4 °F (36.9 °C)   Ht 5' 5\" (1.651 m)   Wt 146 lb 2.6 oz (66.3 kg)   BMI 24.32 kg/m²    NAD, resting comfortably  Incisions clean/dry/intact, no erythema/dehiscence/drainage  Sensation to light touch grossly intact throughout   Can wiggle toes and plantarflex/dorsiflex foot as expected   Toes warm and well perfused  Compartments of foot/leg soft and compressible  No calf swelling/tenderness  No signs of infection      RADIOLOGY:   7/10/2020 No new radiology images today. Prior images reviewed for reference.       ASSESSMENT AND PLAN:     2 weeks s/p above, doing well overall        [x]  Sutures were removed and steri-strips applied            []  Physical Therapy/Home exercises           []  Compression socks    Weight Bearing status:      [x]  NWB  []  PWB  []  WBAT    [x]  WB'ing advance at 10 weeks postop      Immobilization:      [x]  [facility] & [address]  []  CAM boot  []  Comfortable shoe    []  Other:     DVT prophylaxis:      [x]  Continue as prescribed   []  No chemical ppx needed; mechanical only      []  Routine antibiotic prophylaxis

## 2020-07-22 ENCOUNTER — TELEPHONE (OUTPATIENT)
Dept: ORTHOPEDIC SURGERY | Age: 85
End: 2020-07-22

## 2020-07-22 NOTE — TELEPHONE ENCOUNTER
Patient called back, taking 2 325mg tylenol every 4 hours for pain and is elevating. Patient was instructed not to take ibuprofen. Patient will call back with any further concerns.

## 2020-07-22 NOTE — TELEPHONE ENCOUNTER
Patient called asking what else she could possibly do for pain other than take tylenol. No answer when called, left VM to call back. Will try to reach again.

## 2020-07-27 NOTE — TELEPHONE ENCOUNTER
Patient called asking if she could switch from 325mg aspirin to 81mg because she has thin skin and is bleeding if she bumps her arm. Attempted to callback, no answer. Patient is on 325mg for post op anticoagulation for 6 weeks.

## 2020-08-07 ENCOUNTER — OFFICE VISIT (OUTPATIENT)
Dept: ORTHOPEDIC SURGERY | Age: 85
End: 2020-08-07

## 2020-08-07 ENCOUNTER — HOSPITAL ENCOUNTER (OUTPATIENT)
Dept: PHYSICAL THERAPY | Facility: CLINIC | Age: 85
Setting detail: THERAPIES SERIES
Discharge: HOME | End: 2020-08-07
Payer: COMMERCIAL

## 2020-08-07 VITALS — WEIGHT: 146 LBS | HEIGHT: 65 IN | BODY MASS INDEX: 24.32 KG/M2

## 2020-08-07 PROCEDURE — 97161 PT EVAL LOW COMPLEX 20 MIN: CPT

## 2020-08-07 PROCEDURE — 97116 GAIT TRAINING THERAPY: CPT

## 2020-08-07 PROCEDURE — 99024 POSTOP FOLLOW-UP VISIT: CPT | Performed by: ORTHOPAEDIC SURGERY

## 2020-08-07 NOTE — PROGRESS NOTES
[facility]  [address] [address]  Dept: [phone]    Ambulatory Orthopedic Postoperative Visit     Preoperative Diagnosis:   1. Left closed ankle fracture (trimalleolar ankle fracture-dislocation)  2. Left distal tibia varus malunion  3. Hypothyroidism     Postoperative Diagnosis:   1. Same as above     Procedures Performed:  (6/23/2020)  1. Left ankle open reduction internal fixation of trimalleolar ankle fracture (medial, lateral, and posterior malleoli)      SUBJECTIVE:     The patient returns for routine check 6 weeks post op from the above stated procedure. Reports doing well overall, reports improved pain, denies wound drainage/issues, fevers/chills/night sweats, calf swelling/pain, chest pain, shortness of breath. No complaints. She is currently living at home, and is seen here today in the office with her daughter and son. OBJECTIVE:  Ht 5' 5\" (1.651 m)   Wt 146 lb (66.2 kg)   BMI 24.30 kg/m²    NAD, resting comfortably  Incisions clean/dry/intact, no erythema/dehiscence/drainage  Sensation to light touch grossly intact throughout   Can wiggle toes and plantarflex/dorsiflex foot as expected   Toes warm and well perfused  Compartments of foot/leg soft and compressible  No calf swelling/tenderness  No signs of infection      RADIOLOGY:   8/7/2020 FINDINGS:  Three simulated weightbearing views (AP, Mortise, and Lateral) of the left ankle were obtained in the office today and reviewed, revealing intact hardware status post ORIF of trimalleolar ankle fracture. Also noted:  interval healing, Some interval displacement of Medial malleolus and hardware intact without evidence of loosening. No significant interval healing of her medial malleolus. IMPRESSION:  Ankle fracture as above s/p ORIF.      Electronically signed by Stephen Cleveland MD       ASSESSMENT AND PLAN:     6 weeks s/p above, doing well overall but with some shift over medial malleolus and delayed healing        []  Sutures were removed and steri-strips applied            [x]  Physical Therapy/Home exercises       -DME eval today, to help with nonweightbearing precaution, begin weightbearing advance in 10 weeks     [x]  Compression socks    Weight Bearing status:      [x]  NWB  []  PWB  []  WBAT    [x]  WB'ing advance at 10 weeks postop      Immobilization:      []  SLC  [x]  CAM boot  []  Comfortable shoe    []  Other:     DVT prophylaxis:      [x]  Continue as prescribed   []  No chemical ppx needed; mechanical only      []  Routine antibiotic prophylaxis recommendations were discussed      [x]  Avoid the routine use of NSAIDs       [x]  Avoid strenuous activity/pain provoking maneuvers and high-impact repetitive exercises      All questions were answered and the patient agrees with the above plan. The patient will return to clinic in 6 weeks with out of plaster simulated [address] [place] [address]         No follow-ups on file. No orders of the defined types were placed in this encounter. No orders of the defined types were placed in this encounter. Zack Vital MD  Orthopedic Surgery, Foot and Ankle        Please excuse any typos/errors, as this note was created with the assistance of voice recognition software. While intending to generate a document that actually reflects the content of the visit, the document can still have some errors including those of syntax and sound-a-like substitutions which may escape proof reading. In such instances, actual meaning can be extrapolated by context.

## 2020-08-07 NOTE — CONSULTS
[x] THE [facility] &  Therapy  [facility] [address]   [place]: [phone]  F: [phone]         Physical Therapy Evaluation    Date:  2020  Patient: Dinora Booker  : 1935  MRN: [id number]  Physician: Dr Suzan Layne: Medicare   Medical Diagnosis: LLE Ankle ORIF trimalloelar fracture   Rehab Codes: A78.985V  Onset date:  20  Next Dr's appt.: 20    Subjective:   CC/HPI:   Pt fell at home on stairs and missed a step, LLE ankle pain immediately and unable to bear weight. Preoperative Diagnosis:   1. Left closed ankle fracture (trimalleolar ankle fracture-dislocation)  2. Left distal tibia varus malunion  3. Hypothyroidism     Postoperative Diagnosis:   1. Same as above     Procedures Performed:  (2020)  1. Left ankle open reduction internal fixation of trimalleolar ankle fracture (medial, lateral, and posterior malleoli)       PMHx:  Active Problems:    Acquired hypothyroidism    Dyslipidemia    Fall at home, initial encounter    Essential hypertension    Anemia, normocytic normochromic    Tests: [] X-Ray:    [] MRI:    [] Other:     Medications:  [x] Refer to full medical record [] None [] Other:  Allergies:       [x] Refer to full medical record [] None [] Other:        Martial Status Single, lives alone   Home type 1 story home   Stairs from outside 1 with ramp    Stairs inside 0   Employement Retired        Pain present? Yes   Location LLE ankle   Pain Rating currently 5/10   Pain at worse    Pain at best    Description of pain    Altered Sensation    What makes it worse    What makes it better    Pain altered treatment/action    Symptom progression    Sleep              Objective:    ROM:  RLE WNLs  Bilat UEs WNLs                 Strength:   RLE MMT 5/5   LLE  MMT hip, knee WNLs  Bilat UEs MMT 5/5        Educated pt on use of DME, including: (Check box of device used)     [x] Knee Scooter:  Instructed pt on appropriate height being even with contralateral knee. Reviewed safety concerns such as not gliding on turns and using breaks appropriately. [x] Rolling Walker: Instructed pt on appropriate height of even with wrists with arms at resting at side. Educated pt on safe gait pattern while using walker. Explained to pt the difference between a 4 wheeled walker and rolling walker and how a rolling walker is most appropriate for a NWB pt.      [] Axillary Crutches: Instructed pt on appropriate height of two finger width between crutch and axilla, as well as elbow flexion of approximately 20deg. Educated pt on how to adjust both crutch and handle height. Pt with good understanding of all techniques/uses and expressed no safety concerns. At this time pt will most benefit from use of: Rolling walker for home, transfers to chair without difficulty         Assessment:  STG: (to be met in 1 treatments)  1. Educate patient on proper use of DME   2. Patient to perform transfers and weight bearing status independent without assistance   3. Independent with Home Exercise Programs  4. Demonstrate Knowledge of fall prevention                     Patient goals: Walk with NWB status in CAM     Rehab Potential:  [x] Good  [] Fair  [] Poor   Suggested Professional Referral:  [x] No  [] Yes:  Barriers to Goal Achievement[de-identified]  [x] No  [] Yes:  Domestic Concerns:  [x] No  [] Yes:    Pt. Education:  [x] Plans/Goals, Risks/Benefits discussed  [x] Home exercise program    Method of Education: [x] Verbal  [x] Demo  [x] Written  Comprehension of Education:  [x] Verbalizes understanding. [x] Demonstrates understanding. [x] Needs Review. [] Demonstrates/verbalizes understanding of HEP/Ed previously given.     Treatment Plan:  [x] Therapeutic Exercise      [] Manual Therapy       [x] Instruction in HEP        [] Neuromuscular Re-education     [] Vasocompression Arya Whaley)        [] Gait Training                      []  Medication allergies reviewed for use of    Dexamethasone Sodium Phosphate 4mg/ml     with iontophoresis treatments. Pt is not allergic. Frequency:  1 x/week for 1 visits      Todays Treatment:    Education for rolling walker, safety with gait and transfers discussed. NWB status observed with no difficulty today. Educated patient and family members present as well. Evaluation Complexity:  History (Personal factors, comorbidities) [x] 0 [] 1-2 [] 3+   Exam (limitations, restrictions) [x] 1-2 [] 3 [] 4+   Clinical presentation (progression) [x] Stable [] Evolving  [] Unstable   Decision Making [x] Low [] Moderate [] High    [x] Low Complexity [] Moderate Complexity [] High Complexity       Treatment Charges: Mins Units   [x] Evaluation       [x]  Low       []  Moderate       []  High 15 1   []  Modalities     []  Ther Exercise     []  Manual Therapy     []  Ther Activities     []  Aquatics     []  Vasocompression     [x]  Gait 10 1     TOTAL TREATMENT TIME: 25    Time in:1000   Time Out:1030    Electronically signed by: Chaz Claros PT        Physician Signature:________________________________Date:__________________  By signing above or cosigning this note, I have reviewed this plan of care and certify a need for medically necessary rehabilitation services.      *PLEASE SIGN ABOVE AND FAX BACK ALL PAGES*

## 2020-08-24 ENCOUNTER — TELEPHONE (OUTPATIENT)
Dept: ORTHOPEDIC SURGERY | Age: 85
End: 2020-08-24

## 2020-09-08 ENCOUNTER — HOSPITAL ENCOUNTER (OUTPATIENT)
Dept: PHYSICAL THERAPY | Facility: CLINIC | Age: 85
Setting detail: THERAPIES SERIES
Discharge: HOME | End: 2020-09-08
Payer: MEDICARE

## 2020-09-08 PROCEDURE — 97116 GAIT TRAINING THERAPY: CPT

## 2020-09-08 NOTE — FLOWSHEET NOTE
[] [facility]  Outpatient Rehabilitation &  Therapy  [facility]   [place]: [phone]  F: [phone]      Physical Therapy Daily Treatment Note    Date:  2020  Patient Name:  Toan Trent    :  1935  MRN: [id number]  Physician: Dr Gaetano Andino: Medicare   Medical Diagnosis: LLE Ankle ORIF trimalloelar fracture               Rehab Codes: J00.610A  Onset date:    20                        Next Dr's appt.: 20  Visit# / total visits: 2/10; Progress note for Medicare patient due at visit 2     Cancels/No Shows: 0    Subjective:    Pain:  [] Yes  [x] No Location: LLE ankle  Pain Rating: (0-10 scale) 0/10  Pain altered Tx:  [] No  [] Yes  Action:  Comments: Pt returns for update to WB status today. She has been walking NWB at home when using the walker but mainly using wheelchair. Objective:    Precautions:   Written on 20:   Physical Therapy/Home exercises                       -DME eval today, to help with nonweightbearing precaution, begin weightbearing advance in 10 weeks     Educated pt on use of DME, including: (Check box of device used)     [x] Rolling Walker: Instructed pt on appropriate height of even with wrists with arms at resting at side. Educated pt on safe gait pattern while using walker. [] Axillary Crutches: Instructed pt on appropriate height of two finger width between crutch and axilla, as well as elbow flexion of approximately 20deg. Educated pt on how to adjust both crutch and handle height. Pt with good understanding of all techniques/uses and expressed no safety concerns. At this time pt will most benefit from use of: continued walker use       Comments: Educated patient on proper WB status with scale, pt was able to [employer] [facility] up to 60# (PWB for her would be 70#). Discussed gait progression and pt able to demo proper form PWB in CAM boot with walker today.  No pain         Treatment

## 2020-09-08 NOTE — CARE COORDINATION
[place] Fall Risk Assessment    Patient Name:  Love Buchanan  : 1935        Risk Factor Scale  Score   History of Falls [x] Yes  [] No 25  0 25   Secondary Diagnosis [] Yes  [x] No 15  0 0   Ambulatory Aid [] Furniture  [x] Crutches/cane/walker  [] None/bedrest/wheelchair/nurse 30  15  0 15   IV/Heparin Lock [] Yes  [x] No 20  0 0   Gait/Transferring [] Impaired  [] Weak  [x] Normal/bedrest/immobile 20  10  0 0   Mental Status [] Forgets limitations  [x] Oriented to own ability 15  0 0      Total:40     Based on the Assessment score: check the appropriate box.     []  No intervention needed   Low =   Score of 0-24    [x]  Use standard prevention interventions Moderate =  Score of 24-44   [] Give patient handout and discuss fall prevention strategies   [] Establish goal of education for patient/family RE: fall prevention strategies    []  Use high risk prevention interventions High = Score of 45 and higher   [] Give patient handout and discuss fall prevention strategies   [] Establish goal of education for patient/family Re: fall prevention strategies   [] Discuss lifeline / other resources    Electronically signed by:   Rehana Ma, PT  Date: 2020

## 2020-09-22 ENCOUNTER — HOSPITAL ENCOUNTER (OUTPATIENT)
Dept: PHYSICAL THERAPY | Facility: CLINIC | Age: 85
Setting detail: THERAPIES SERIES
Discharge: HOME | End: 2020-09-22
Payer: MEDICARE

## 2020-09-22 PROCEDURE — 97116 GAIT TRAINING THERAPY: CPT

## 2020-09-22 NOTE — CARE COORDINATION
Medicare Cap   [] Physical Therapy  [] Speech Therapy  [] Occupational therapy  *PT and Speech caps combine      $2010 Cap limit < kx modifier needed < $0196 requires pre-cert        Patient Name: Christopher Mata  YOB: 1935    Note:  This is an estimate of charges billed.                 Date of [address] Name # units/ charge $$$ charge Daily Total Charge Ongoing Total $$$   8-7-20 eval  gait 1  1 82.82  23.06 105.88 105.88    9-8-20 gait 2 29.40  23.06 52.46 158.34   9-22-20 gait  2  29.40  23.06  52.46  210.80

## 2020-09-22 NOTE — FLOWSHEET NOTE
[] [facility]  Outpatient Rehabilitation &  Therapy  [facility]   [place]: [phone]  F: [phone]      Physical Therapy Daily Treatment Note    Date:  2020  Patient Name:  Munir Norman    :  1935  MRN: [id number]  Physician: Dr Aleja Elmore: Medicare   Medical Diagnosis: LLE Ankle ORIF trimalloelar fracture               Rehab Codes: D15.287I  Onset date:    20                        Next 's appt.: 20  Visit# / total visits: 3/10; Progress note for Medicare patient due at visit 10     Cancels/No Shows: 0    Subjective:    Pain:  [] Yes  [x] No Location: LLE ankle  Pain Rating: (0-10 scale) 0/10  Pain altered Tx:  [] No  [] Yes  Action:  Comments: Pt returns today after walking 50% WB for 2 weeks, today starts 75% PWB for 2 weeks. No pain at arrival, reports focus on walking throughout the day and not overdoing it. Objective:    Precautions:   Written on 20:   Physical Therapy/Home exercises                       -DME eval today, to help with nonweightbearing precaution, begin weightbearing advance in 10 weeks     Precautions: start at 10 wks post-op  (in CAM boot when ambulating) 50% Partial Wb'ing x 2 weeks, then 75% Partial Wb'ing x 2 weeks, then WBAT if pt is comfortable. Educated pt on use of DME, including: (Check box of device used)     [x] Rolling Walker: Instructed pt on appropriate height of even with wrists with arms at resting at side. Educated pt on safe gait pattern while using walker. [] Axillary Crutches: Instructed pt on appropriate height of two finger width between crutch and axilla, as well as elbow flexion of approximately 20deg. Educated pt on how to adjust both crutch and handle height. Step: Educated patient on going up and down step on the curb with good understanding and performance. SBA for safety.      Pt with good understanding of all techniques/uses and

## 2020-09-30 ENCOUNTER — OFFICE VISIT (OUTPATIENT)
Dept: ORTHOPEDIC SURGERY | Age: 85
End: 2020-09-30

## 2020-09-30 VITALS — BODY MASS INDEX: 24.32 KG/M2 | HEIGHT: 65 IN | WEIGHT: 146 LBS

## 2020-09-30 PROCEDURE — 99024 POSTOP FOLLOW-UP VISIT: CPT | Performed by: ORTHOPAEDIC SURGERY

## 2020-09-30 NOTE — PROGRESS NOTES
[facility]  [address] [address]  Dept: [phone]    Ambulatory Orthopedic Postoperative Visit     Preoperative Diagnosis:   1. Left closed ankle fracture (trimalleolar ankle fracture-dislocation)  2. Left distal tibia varus malunion  3. Hypothyroidism     Postoperative Diagnosis:   1. Same as above     Procedures Performed:  (6/23/2020)  1. Left ankle open reduction internal fixation of trimalleolar ankle fracture (medial, lateral, and posterior malleoli)      SUBJECTIVE:     The patient returns for routine check 12 weeks post op from the above stated procedure. Reports doing well overall, reports improved pain, denies wound drainage/issues, fevers/chills/night sweats, calf swelling/pain, chest pain, shortness of breath. No complaints. She is seen here today in the office with her daughter. She reports that she is getting some pain in her heel when ambulating, and she is currently at 75% weightbearing in the boot with a walker. OBJECTIVE:  Ht 5' 5\" (1.651 m)   Wt 146 lb (66.2 kg)   BMI 24.30 kg/m²    NAD, resting comfortably  Incisions clean/dry/intact, no erythema/dehiscence/drainage  Sensation to light touch grossly intact throughout   Can wiggle toes and plantarflex/dorsiflex foot as expected   Toes warm and well perfused  Compartments of foot/leg soft and compressible  No calf swelling/tenderness  No signs of infection  Tenderness over the hardware medially and laterally      RADIOLOGY:   9/30/2020 FINDINGS:  Three weightbearing views (AP, Mortise, and Lateral) of the left ankle were obtained in the office today and reviewed, revealing intact hardware status post ORIF of trimalleolar ankle fracture. Interval healing noted without interval displacement. IMPRESSION:  Ankle fracture as above s/p ORIF.      Electronically signed by Philippe Rosenberg MD       ASSESSMENT AND PLAN:     12 weeks s/p above,

## 2020-10-06 ENCOUNTER — HOSPITAL ENCOUNTER (OUTPATIENT)
Dept: PHYSICAL THERAPY | Facility: CLINIC | Age: 85
Setting detail: THERAPIES SERIES
Discharge: HOME | End: 2020-10-06
Payer: MEDICARE

## 2020-10-06 PROCEDURE — 97116 GAIT TRAINING THERAPY: CPT

## 2020-10-06 PROCEDURE — 97110 THERAPEUTIC EXERCISES: CPT

## 2020-10-06 NOTE — FLOWSHEET NOTE
[] [facility]  Outpatient Rehabilitation &  Therapy  [facility]   [place]: [phone]  F: [phone]      Physical Therapy Daily Treatment Note and Progress summary     Date:  10/6/2020  Patient Name:  Mayte Hamilton    :  1935  MRN: [id number]  Physician: Dr Dixon Layer: Medicare   Medical Diagnosis: LLE Ankle ORIF trimalloelar fracture               Rehab Codes: T17.030C  Onset date:    20                        Next 's appt.: 20  Visit# / total visits: 3/10; Progress note for Medicare patient due at visit 10     Cancels/No Shows: 0    Subjective:    Pain:  [] Yes  [x] No Location: LLE ankle  Pain Rating: (0-10 scale) 3/10  Pain altered Tx:  [] No  [] Yes  Action:  Comments: Pt returns today after walking 75% WB for 2 weeks, today starts WBAT. No pain at arrival, reports she is continuing to focus on not overdoing it with walking around the home      Objective:    Precautions:   Written on 20:   Physical Therapy/Home exercises       Precautions: start at 10 wks post-op  (in CAM boot when ambulating) 50% Partial Wb'ing x 2 weeks, then 75% Partial Wb'ing x 2 weeks, then WBAT if pt is comfortable     Addition to orders for PT on 20: gait training, range of motion, and gentle strengthening. Educated pt on use of DME, including: (Check box of device used)     [x] Rolling Walker: Instructed pt on appropriate height of even with wrists with arms at resting at side. Educated pt on safe gait pattern while using walker. [] Axillary Crutches: Instructed pt on appropriate height of two finger width between crutch and axilla, as well as elbow flexion of approximately 20deg. Educated pt on how to adjust both crutch and handle height. Step: Educated patient on going up and down step on the curb with good understanding and performance. SBA for safety.      Pt with good understanding of all techniques/uses and expressed no safety concerns. At this time pt will most benefit from use of: continued walker use WBAT     Gait, ROM, gentle strengthening  WBAT   Exercise     LLE Ankle ORIF trimalloelar fracture        Reps/ Time Weight/ Level Comments   Nustep   Add next, no resistance              *Ankle pumps x20       *Ankle circles x20       *Inv/Ever AROM  x20      *Towel curls x20            4 way ankle isometrics GENTLE    Add next    Toe yoga   Add next   Calf towel S Gentle    Add next          Gait               LLE fig 8: 51.5 cm      LLE ROM  PF 30  DF -20  Inv 18  Ever 5    LLE MMT  PF 4/5  DF 4-/5  Inv 4-/5  Ever 3+/5      Treatment Charges: Mins Units   []  Modalities     [x]  Ther Exercise 30 2   []  Manual Therapy     []  Ther Activities     []  Aquatics     []  Vasocompression     [x]  Gait 10 1   Total Treatment time 40 3       Assessment: [] Progressing toward goals. [] No change. [] Other:  [x] Patient would continue to benefit from skilled physical therapy services in order to: safely progress WB and add strength training once ordered from Dr Teddy Urbina. NEW STG: (to be met in 6 treatments)  1. Educate patient on proper use of DME and WB progression ONGOING  2. Patient to perform transfers and weight bearing status independent without assistance MET, ongoing with updates to WB status  3. Independent with Home Exercise Programs NOT MET   4. Demonstrate Knowledge of fall prevention ONGOING     NEW LTG: To be met in 10 visits    1. Increase LE strength to 5/5 WNLs to ease mobility  2. Increase flexibility/ROM to WNLs bilat          Frequency:  1-2 x/week for 10 visits     Pt. Education:  [x] Yes  [] No  [x] Reviewed Prior HEP/Ed  Method of Education: [x] Verbal  [x] Demo  [] Written  Comprehension of Education:  [x] Verbalizes understanding. [x] Demonstrates understanding. [] Needs review. [] Demonstrates/verbalizes HEP/Ed previously given.      Plan: [] Continue current frequency toward long and short term goals.     [x] Specific Instructions for subsequent treatments: advance WB as protocol allows          Time In:1000         Time Out:1050    Electronically signed by:  Leila Brandt PT

## 2020-10-06 NOTE — CARE COORDINATION
Medicare Cap   [] Physical Therapy  [] Speech Therapy  [] Occupational therapy  *PT and Speech caps combine      $2010 Cap limit < kx modifier needed < $1058 requires pre-cert        Patient Name: Andrea Gill  YOB: 1935    Note:  This is an estimate of charges billed.                 Date of [address] Name # units/ charge $$$ charge Daily Total Charge Ongoing Total $$$   8-7-20 eval  gait 1  1 82.82  23.06 105.88 105.88    9-8-20 gait 2 29.40  23.06 52.46 158.34   9-22-20 gait  2  29.40  23.06  52.46  210.80    10-6-20 Gait  therex  1  2 29.40  29.72  23.22 82.34 293.14

## 2020-10-13 ENCOUNTER — HOSPITAL ENCOUNTER (OUTPATIENT)
Dept: PHYSICAL THERAPY | Facility: CLINIC | Age: 85
Setting detail: THERAPIES SERIES
Discharge: HOME | End: 2020-10-13
Payer: MEDICARE

## 2020-10-13 PROCEDURE — 97110 THERAPEUTIC EXERCISES: CPT

## 2020-10-13 NOTE — CARE COORDINATION
Medicare Cap   [] Physical Therapy  [] Speech Therapy  [] Occupational therapy  *PT and Speech caps combine      $2010 Cap limit < kx modifier needed < $9828 requires pre-cert        Patient Name: Eva Peters  YOB: 1935    Note:  This is an estimate of charges billed.                 Date of [address] Name # units/ charge $$$ charge Daily Total Charge Ongoing Total $$$   8-7-20 eval  gait 1  1 82.82  23.06 105.88 105.88    9-8-20 gait 2 29.40  23.06 52.46 158.34   9-22-20 gait  2  29.40  23.06  52.46  210.80    10-6-20 Gait  therex  1  2 29.40  29.72  23.22 82.34 293.14    10-13-20  there ex  2  25.26  19.74  45  338.14

## 2020-10-13 NOTE — FLOWSHEET NOTE
[] [facility]  Outpatient Rehabilitation &  Therapy  [facility]   [place]: [phone]  F: [phone]      Physical Therapy Daily Treatment Note and Progress summary     Date:  10/13/2020  Patient Name:  Rosita Alvarado    :  1935  MRN: [id number]  Physician: Dr Gabriel Basurto: Medicare   Medical Diagnosis: LLE Ankle ORIF trimalloelar fracture               Rehab Codes: E43.237E  Onset date:    20                        Next 's appt.: 20  Visit# / total visits: 4/10; Progress note for Medicare patient due at visit 10     Cancels/No Shows: 0/0    Subjective:    Pain:  [] Yes  [x] No Location: LLE ankle  Pain Rating: (0-10 scale) 3/10  Pain altered Tx:  [x] No  [] Yes  Action:  Comments: Patient states she is experiencing some discomfort inside the cam boot along the inside of  Her foot and ankle. Objective:    Precautions:   Written on 20:   Physical Therapy/Home exercises       Precautions: start at 10 wks post-op  (in CAM boot when ambulating) 50% Partial Wb'ing x 2 weeks, then 75% Partial Wb'ing x 2 weeks, then WBAT if pt is comfortable     Addition to orders for PT on 20: gait training, range of motion, and gentle strengthening. Educated pt on use of DME, including: (Check box of device used)     [x] Rolling Walker: Instructed pt on appropriate height of even with wrists with arms at resting at side. Educated pt on safe gait pattern while using walker. [] Axillary Crutches: Instructed pt on appropriate height of two finger width between crutch and axilla, as well as elbow flexion of approximately 20deg. Educated pt on how to adjust both crutch and handle height. Step: Educated patient on going up and down step on the curb with good understanding and performance. SBA for safety. Pt with good understanding of all techniques/uses and expressed no safety concerns.  At this time pt will most benefit from use of: continued walker use WBAT     Gait, ROM, gentle strengthening  WBAT   Exercise     LLE Ankle ORIF trimalloelar fracture        Reps/ Time Weight/ Level Comments   Nustep 5min               *Ankle pumps x20       *Ankle circles x20       *Inv/Ever AROM  x20      *Towel curls x20            4 way ankle isometrics GENTLE  15x     Toe yoga 10x2 ea direction     Calf towel S Gentle  3x 30sec           Gait               LLE fig 8: 51.5 cm      LLE ROM  PF 30  DF -20  Inv 18  Ever 5    LLE MMT  PF 4/5  DF 4-/5  Inv 4-/5  Ever 3+/5      Treatment Charges: Mins Units   []  Modalities     [x]  Ther Exercise 35 2   []  Manual Therapy     []  Ther Activities     []  Aquatics     []  Vasocompression     []  Gait     Total Treatment time 35 2       Assessment: [x] Progressing toward goals. Initiated session with warm up on Nustep for 5 minutes followed by charted exercises. Patient has limited range with ankle pumps and circles added isometric contractions today with good tolerance. Patient struggled with toe yoga patient requires tactile cues. Review new exercises next session and if tolerated add to HEP. Patient exhibited pink area on top of her foot explained to patient to be careful with how tight the boot is and to keep an eye on the spot to make sure it doesn't become worse or painful Patient verbalized that she understood. [] No change. [] Other:  [x] Patient would continue to benefit from skilled physical therapy services in order to: safely progress WB and add strength training once ordered from Dr Rupinder Peters. NEW STG: (to be met in 6 treatments)  1. Educate patient on proper use of DME and WB progression ONGOING  2. Patient to perform transfers and weight bearing status independent without assistance MET, ongoing with updates to WB status  3. Independent with Home Exercise Programs NOT MET   4. Demonstrate Knowledge of fall prevention ONGOING     NEW LTG: To be met in 10 visits    1. Increase LE strength to 5/5 WNLs to ease mobility  2. Increase flexibility/ROM to WNLs bilat          Frequency:  1-2 x/week for 10 visits     Pt. Education:  [x] Yes  [] No  [x] Reviewed Prior HEP/Ed  Method of Education: [x] Verbal  [x] Demo  [] Written  Comprehension of Education:  [x] Verbalizes understanding. [x] Demonstrates understanding. [] Needs review. [] Demonstrates/verbalizes HEP/Ed previously given. Plan: [] Continue current frequency toward long and short term goals.     [x] Specific Instructions for subsequent treatments: advance WB as protocol allows          Time In: 1:15 PM         Time Out: 2:05 PM    Electronically signed by:  Landon Watters PTA

## 2020-10-15 ENCOUNTER — HOSPITAL ENCOUNTER (OUTPATIENT)
Dept: PHYSICAL THERAPY | Facility: CLINIC | Age: 85
Setting detail: THERAPIES SERIES
Discharge: HOME | End: 2020-10-15
Payer: MEDICARE

## 2020-10-15 PROCEDURE — 97110 THERAPEUTIC EXERCISES: CPT

## 2020-10-15 NOTE — FLOWSHEET NOTE
[] [facility]  Outpatient Rehabilitation &  Therapy  [facility]   [place]: [phone]  F: [phone]      Physical Therapy Daily Treatment Note and Progress summary     Date:  10/15/2020  Patient Name:  Ozzy Jiménez    :  1935  MRN: [id number]  Physician: Dr Lena Manrique: Medicare   Medical Diagnosis: LLE Ankle ORIF trimalloelar fracture               Rehab Codes: G05.823W  Onset date:    20                        Next Dr's appt.: 20  Visit# / total visits: 5/10; Progress note for Medicare patient due at visit 10     Cancels/No Shows: 0/0    Subjective:    Pain:  [] Yes  [x] No Location: LLE ankle  Pain Rating: (0-10 scale) 5/10  Pain altered Tx:  [x] No  [] Yes  Action:  Comments: Patient states she is having pain and soreness and in the ankle from the boot rubbing against it. Less pain since wearing a sock on the foot over the wrap    Objective:    Precautions:   Written on 20:   Physical Therapy/Home exercises       Precautions: start at 10 wks post-op  (in CAM boot when ambulating) 50% Partial Wb'ing x 2 weeks, then 75% Partial Wb'ing x 2 weeks, then WBAT if pt is comfortable     Addition to orders for PT on 20: gait training, range of motion, and gentle strengthening.     Gait, ROM, gentle strengthening  WBAT   Exercise     LLE Ankle ORIF trimalloelar fracture Reps/ Time Weight/ Level Comments   Nustep L1 x10'               *Ankle pumps x20       *Ankle circles x20       *Inv/Ever AROM  x20      *Towel curls x20            4 way ankle isometrics GENTLE  5\"x20     Toe yoga 10x2 ea direction     Calf towel S Gentle  30\"x3     BAPS  seated x20  Side to side                      Gait  WBAT with walker              LLE ROM    Treatment Charges: Mins Units   []  Modalities     [x]  Ther Exercise 36 2   []  Manual Therapy     []  Ther Activities     []  Aquatics     []  Vasocompression     []  Gait     Total Treatment time 36 2       Assessment: [x] Progressing toward goals. Patient tolerated session well, walked through all ex's with verbal and demo needed to perform. Reports adherence to ROM ex's at home, given handout for isometrics and towel curls/towel calf stretch for home. Pt still has a small pink area on dorsum of LLE foot, she is using a small gauze pad and ACE wrap. [] No change. [] Other:  [x] Patient would continue to benefit from skilled physical therapy services in order to: safely progress WB and add strength training once ordered from Dr Karina Rushing. NEW STG: (to be met in 6 treatments)  1. Educate patient on proper use of DME and WB progression ONGOING  2. Patient to perform transfers and weight bearing status independent without assistance MET, ongoing with updates to WB status  3. Independent with Home Exercise Programs NOT MET   4. Demonstrate Knowledge of fall prevention ONGOING     NEW LTG: To be met in 10 visits    1. Increase LE strength to 5/5 WNLs to ease mobility  2. Increase flexibility/ROM to WNLs bilat      Frequency:  1-2 x/week for 10 visits     Pt. Education:  [x] Yes  [] No  [x] Reviewed Prior HEP/Ed  Method of Education: [x] Verbal  [x] Demo  [] Written  Comprehension of Education:  [x] Verbalizes understanding. [x] Demonstrates understanding. [] Needs review. [] Demonstrates/verbalizes HEP/Ed previously given. Plan: [] Continue current frequency toward long and short term goals.     [x] Specific Instructions for subsequent treatments: advance WB as protocol allows          Time In: 3988        Time Out: 5766    Electronically signed by:  Peggy Lara PT

## 2020-10-20 ENCOUNTER — HOSPITAL ENCOUNTER (OUTPATIENT)
Dept: PHYSICAL THERAPY | Facility: CLINIC | Age: 85
Setting detail: THERAPIES SERIES
Discharge: HOME | End: 2020-10-20
Payer: MEDICARE

## 2020-10-20 PROCEDURE — 97110 THERAPEUTIC EXERCISES: CPT

## 2020-10-20 NOTE — FLOWSHEET NOTE
[x] [facility]  Outpatient Rehabilitation &  Therapy  [facility]   [place]: [phone]  F: [phone]      Physical Therapy Daily Treatment Note and Progress summary     Date:  10/20/2020  Patient Name:  Suzan Alcantar    :  1935  MRN: [id number]  Physician: Dr Coronado Spotted: Medicare   Medical Diagnosis: LLE Ankle ORIF trimalloelar fracture               Rehab Codes: W53.898C  Onset date:    20                        Next 's appt.: 20  Visit# / total visits: 6/10; Progress note for Medicare patient due at visit 10     Cancels/No Shows: 0/0    Subjective:    Pain:  [x] Yes  [] No Location: LLE ankle  Pain Rating: (0-10 scale) 4/10  Pain altered Tx:  [x] No  [] Yes  Action:  Comments: Pt arrives amb with rolling walker with CAM boot donned. Mentions pain in L upper trap and neck at -7/10 this date d/t lifting walker in and out of car. Pt reports soreness in ankle from boot rubbing against it and irritation in toes d/t heel slipping fwd in CAM boot. Objective:    Precautions:   Written on 20:   Physical Therapy/Home exercises       Precautions: start at 10 wks post-op  (in CAM boot when ambulating) 50% Partial Wb'ing x 2 weeks, then 75% Partial Wb'ing x 2 weeks, then WBAT if pt is comfortable     Addition to orders for PT on 20: gait training, range of motion, and gentle strengthening.     Gait, ROM, gentle strengthening  WBAT   Exercise     LLE Ankle ORIF trimalloelar fracture Reps/ Time Weight/ Level Comments   Nustep L1 x10'               *Ankle pumps x20       *Ankle circles x20 ea       *Inv/Ever AROM  x20      *Towel curls x20      Toe splay x20           4 way ankle isometrics GENTLE  5\"x20     Toe yoga 10x2 ea direction     Calf towel S Gentle  30\"x3     BAPS  seated x20  Side to side                      Gait  WBAT with walker      Education   Posture, stretches, HEP, AD alignment     LLE ROM    Treatment Charges: Mins Units   []  Modalities     [x]  Ther Exercise 40 3   [x]  Manual Therapy 5 --   []  Ther Activities     []  Aquatics     []  Vasocompression     []  Gait     Total Treatment time 45 3       Assessment: [x] Progressing toward goals. Cont with charted exercises listed above with addition of toe splays this date. Manual pressure applied to big toe during toe yoga to isolate, with increased range noted. Pink area still present on dorsum of foot which is tender to the touch, gauze applied prior to ace wrap for extra cushion. Pt with good tolerance to program. Educated pt on upper trap and levator stretches and scap retractions to help with posture. Ended with hypervolt to upper traps and paraspinals to alleviate pain. Pt instructed to apply heat to neck/upper traps as needed. Also, instructed to wear non slip sock over ace bandage to help keep L foot in place while wearing CAM boot. Pt mentions she raised walker d/t hunched over posture, however, increased upper trap activation noticed with change in height. Rolling walker readjusted to correct height for pt, and re instructed in proper posture/positioning with AD.    [] No change. [] Other:  [x] Patient would continue to benefit from skilled physical therapy services in order to: safely progress WB and add strength training once ordered from Dr Sundeep Watkins. NEW STG: (to be met in 6 treatments)  1. Educate patient on proper use of DME and WB progression ONGOING  2. Patient to perform transfers and weight bearing status independent without assistance MET, ongoing with updates to WB status  3. Independent with Home Exercise Programs NOT MET   4. Demonstrate Knowledge of fall prevention ONGOING     NEW LTG: To be met in 10 visits    1. Increase LE strength to 5/5 WNLs to ease mobility  2. Increase flexibility/ROM to WNLs bilat      Frequency:  1-2 x/week for 10 visits     Pt.  Education:  [x] Yes  [] No  [x] Reviewed Prior HEP/Ed  Method of Education: [x] Verbal  [x] Demo  [] Written  Comprehension of Education:  [x] Verbalizes understanding. [x] Demonstrates understanding. [] Needs review. [] Demonstrates/verbalizes HEP/Ed previously given. Plan: [] Continue current frequency toward long and short term goals.     [x] Specific Instructions for subsequent treatments: advance WB as protocol allows          Time In: 10:00 am       Time Out: 10:55 am    Electronically signed by:  Vinh Alexis PTA

## 2020-10-20 NOTE — CARE COORDINATION
Medicare Cap   [] Physical Therapy  [] Speech Therapy  [] Occupational therapy  *PT and Speech caps combine      $2010 Cap limit < kx modifier needed < $3204 requires pre-cert        Patient Name: Antonino Rubio  YOB: 1935    Note:  This is an estimate of charges billed.                 Date of [address] Name # units/ charge $$$ charge Daily Total Charge Ongoing Total $$$   8-7-20 eval  gait 1  1 82.82  23.06 105.88 105.88    9-8-20 gait 2 29.40  23.06 52.46 158.34   9-22-20 gait  2  29.40  23.06  52.46  210.80    10-6-20 Gait  therex  1  2 29.40  29.72  23.22 82.34 293.14    10-13-20  there ex  2  25.26  19.74  45  338.14   10-15-20  there ex  2  29.72+23.22  52.94  391.08   10-20-20  there ex  3  25.26+19.74+19.74  64.74  455.82

## 2020-10-21 ENCOUNTER — TELEPHONE (OUTPATIENT)
Dept: ORTHOPEDIC SURGERY | Age: 85
End: 2020-10-21

## 2020-10-22 ENCOUNTER — HOSPITAL ENCOUNTER (OUTPATIENT)
Dept: PHYSICAL THERAPY | Facility: CLINIC | Age: 85
Setting detail: THERAPIES SERIES
Discharge: HOME | End: 2020-10-22
Payer: MEDICARE

## 2020-10-22 PROCEDURE — 97110 THERAPEUTIC EXERCISES: CPT

## 2020-10-22 NOTE — FLOWSHEET NOTE
[x] [facility]  Outpatient Rehabilitation &  Therapy  [facility]   [place]: [phone]  F: [phone]      Physical Therapy Daily Treatment Note    Date:  10/22/2020  Patient Name:  Denia Jane    :  1935  MRN: [id number]  Physician: Dr Lemus Truong: Medicare   Medical Diagnosis: LLE Ankle ORIF trimalloelar fracture               Rehab Codes: X22.309P  Onset date:    20                        Next 's appt.: 20  Visit# / total visits: 8/10; Progress note for Medicare patient due at visit 10     Cancels/No Shows: 0/0    Subjective:    Pain:  [x] Yes  [] No Location: LLE ankle  Pain Rating: (0-10 scale) 4/10  Pain altered Tx:  [x] No  [] Yes  Action:  Comments: Pt arrives today with mild pain in the LLE ankle, worse with WB and moving but overall feeling good. Her UT is still tight on the left side but feels better than it did on Tuesday. Objective:    Precautions:  Written on 20:  Physical Therapy/Home exercises       Precautions: start at 10 wks post-op  (in CAM boot when ambulating) 50% Partial Wb'ing x 2 weeks, then 75% Partial Wb'ing x 2 weeks, then WBAT if pt is comfortable     Addition to orders for PT on 20: gait training, range of motion, and gentle strengthening.       Gait, ROM, gentle strengthening  WBAT   Exercise     LLE Ankle ORIF trimalloelar fracture Reps/ Time Weight/ Level Comments   Nustep L2 x10'               *4 way ankle AROM  x20      *Towel curls 3'            4 way ankle isometrics  5\"x20 GENTLE     Toe yoga 10x2 ea      Calf towel S Gentle  30\"x3     BAPS  seated x20  Side to side    MOBO range x20 2/4 setting          // bars: Bilat all      Hip flexion x20     Knee flexion x20     Hip abd  x20     Hip ext x20     Mini-squat x20           TGym squats L12 x20       LLE AROM Ankle:  DF -15   PF 25 (10 total mobility)  Inv 11  Ever 8      Treatment Charges: Mins Units   []

## 2020-10-23 ENCOUNTER — APPOINTMENT (OUTPATIENT)
Dept: GENERAL RADIOLOGY | Facility: CLINIC | Age: 85
End: 2020-10-23
Payer: MEDICARE

## 2020-10-23 ENCOUNTER — HOSPITAL ENCOUNTER (EMERGENCY)
Facility: CLINIC | Age: 85
Discharge: HOME | End: 2020-10-23
Attending: EMERGENCY MEDICINE
Payer: MEDICARE

## 2020-10-23 ENCOUNTER — TELEPHONE (OUTPATIENT)
Dept: ORTHOPEDIC SURGERY | Age: 85
End: 2020-10-23

## 2020-10-23 ENCOUNTER — APPOINTMENT (OUTPATIENT)
Dept: CT IMAGING | Facility: CLINIC | Age: 85
End: 2020-10-23
Payer: MEDICARE

## 2020-10-23 VITALS
HEART RATE: 66 BPM | OXYGEN SATURATION: 97 % | TEMPERATURE: 98.2 F | SYSTOLIC BLOOD PRESSURE: 162 MMHG | BODY MASS INDEX: 22.66 KG/M2 | HEIGHT: 65 IN | RESPIRATION RATE: 20 BRPM | WEIGHT: 136 LBS | DIASTOLIC BLOOD PRESSURE: 81 MMHG

## 2020-10-23 PROCEDURE — 73610 X-RAY EXAM OF ANKLE: CPT

## 2020-10-23 PROCEDURE — 99282 EMERGENCY DEPT VISIT SF MDM: CPT

## 2020-10-23 PROCEDURE — 70450 CT HEAD/BRAIN W/O DYE: CPT

## 2020-10-23 ASSESSMENT — PAIN DESCRIPTION - FREQUENCY: FREQUENCY: CONTINUOUS

## 2020-10-23 ASSESSMENT — PAIN SCALES - GENERAL: PAINLEVEL_OUTOF10: 5

## 2020-10-23 ASSESSMENT — PAIN DESCRIPTION - ORIENTATION: ORIENTATION: LEFT

## 2020-10-23 ASSESSMENT — PAIN DESCRIPTION - LOCATION: LOCATION: ANKLE

## 2020-10-23 ASSESSMENT — PAIN DESCRIPTION - DESCRIPTORS: DESCRIPTORS: ACHING

## 2020-10-23 NOTE — ED PROVIDER NOTES
[facility]  [facility]  Phone: [facility]  EMERGENCY DEPARTMENT ENCOUNTER      Pt Name: Maura Kanner  MRN: [id number]  Julietgfjet 1935  Date of evaluation: 10/23/2020  Provider: Lacey Akers DO    CHIEF COMPLAINT       Chief Complaint   Patient presents with   [facility] Fall     pt had left ankle surgery on 6/23, pt is wearing an ortho boot. pt states she sat back in a w/c last night and it wasn't locked, pt fell onto the carpet, landing on her buttocks, states she hit her head but denies LOC. pt called Dr Randy Dill this am and he wanted the ankle checked due to she didn't have her boot on when she fell. pt denies any injury to her ankle. pt also wants \"my back and head check just in case\"         HISTORY OF PRESENT ILLNESS   (Location/Symptom, Timing/Onset,Context/Setting, Quality, Duration, Modifying Factors, Severity)  Note limiting factors. Maura Kanner is a 82 y.o. female who presents to the emergency department of her left ankle. Patient had surgery on her ankle back in June. She has been doing well, she has been wearing a walking boot and walking on it. She has been doing therapy. She states yesterday she was in her wheelchair and she fell out of it. She does not think she reinjured her left ankle but her surgeon recommended she come get it checked out and get an x-ray. She did hit her head when she fell, she did not lose consciousness, she is not on blood thinners, she had no vomiting after the incident, no amnesia to events and no pain in her head. No pain in her neck. No numbness, tingling or weakness in her arms or legs. During the fall she sustained minor pain in her left shoulder and left hip, she is able to move her upper and lower extremities, she was actually able to walk on her left lower extremity with her walking boot today. She is not requesting any pain medication. Nursing Notes were reviewed.     REVIEW OF SYSTEMS    (2-9systems for level 4, 10 or more for level 5)     Review of Systems   Musculoskeletal:        Left hip and ankle pain   Neurological: Negative for weakness and numbness. Except asnoted above the remainder of the review of systems was reviewed and negative. PAST MEDICAL HISTORY     Past Medical History:   Diagnosis Date    Anemia     Anxiety     Depression     GERD (gastroesophageal reflux disease)     Hypertension     Hypothyroidism     Insomnia     PTSD (post-traumatic stress disorder)     Vitiligo          SURGICAL HISTORY       Past Surgical History:   Procedure Laterality Date    ANKLE FRACTURE SURGERY Left 6/23/2020    LEFT ANKLE  ORIF - OSWALDO performed by Joanne Hummel MD at [address] [place] [address]      THYROID SURGERY      remove of tumor         CURRENT MEDICATIONS     Previous Medications    ALPRAZOLAM (XANAX) 0.25 MG TABLET    Take 0.25 mg by mouth 2 times daily as needed.     ASPIRIN 325 MG EC TABLET    Take 1 tablet by mouth daily    ASPIRIN 325 MG EC TABLET    Take 1 tablet by mouth daily    BUPROPION (WELLBUTRIN XL) 300 MG XL TABLET    Take 300 mg by mouth every morning     BUSPIRONE (BUSPAR) 10 MG TABLET    Take 10 mg by mouth 2 times daily     DOCUSATE SODIUM (COLACE) 100 MG CAPSULE    Take 1 capsule by mouth 2 times daily as needed for Constipation    FAMOTIDINE (PEPCID) 40 MG TABLET    Take 40 mg by mouth nightly    FLUTICASONE (FLONASE) 50 MCG/ACT NASAL SPRAY    2 sprays by Nasal route daily    HANDICAP PLACARD MISC    by Does not apply route Expires in 3 months    HYDROCHLOROTHIAZIDE (HYDRODIURIL) 25 MG TABLET    take 1 tablet by mouth once daily    LEVOTHYROXINE (SYNTHROID) 25 MCG TABLET    take 1 tablet by mouth once daily    ONDANSETRON (ZOFRAN) 4 MG TABLET    Take 1 tablet by mouth every 12 hours as needed for Nausea or Vomiting    OXYBUTYNIN (DITROPAN XL) 5 MG CR TABLET    Take 1 tablet by mouth daily TRAZODONE (DESYREL) 100 MG TABLET    Take 100 mg by mouth nightly     TRIAMCINOLONE (KENALOG) 0.1 % CREAM    2 times daily as needed (ezcema)     VITAMIN B-12 (CYANOCOBALAMIN) 1000 MCG TABLET    Take 1,000 mcg by mouth daily       ALLERGIES     Ampicillin; Erythromycin; Doxycycline; Paroxetine; Paroxetine hcl; Penicillins; and Sulfa antibiotics    FAMILY HISTORY       Family History   Problem Relation Age of Onset    Stroke Mother     High Blood Pressure Mother           SOCIAL HISTORY       Social History     Socioeconomic History    Marital status:       Spouse name: Not on file    Number of children: Not on file    Years of education: Not on file    Highest education level: Not on file   Occupational History    Not on file   Social Needs    Financial resource strain: Not on file    Food insecurity     Worry: Not on file     Inability: Not on file    Transportation needs     Medical: Not on file     Non-medical: Not on file   Tobacco Use    Smoking status: Never Smoker    Smokeless tobacco: Never Used   Substance and Sexual Activity    Alcohol use: Yes     Comment: occas    Drug use: No    Sexual activity: Not Currently   Lifestyle    Physical activity     Days per week: Not on file     Minutes per session: Not on file    Stress: Not on file   Relationships    Social connections     Talks on phone: Not on file     Gets together: Not on file     Attends Restoration service: Not on file     Active member of club or organization: Not on file     Attends meetings of clubs or organizations: Not on file     Relationship status: Not on file    Intimate partner violence     Fear of current or ex partner: Not on file     Emotionally abused: Not on file     Physically abused: Not on file     Forced sexual activity: Not on file   Other Topics Concern    Not on file   Social History Narrative    Not on file       SCREENINGS             PHYSICAL EXAM    (up to 7 for level 4, 8 or more for level 5) ED Triage Vitals [10/23/20 0933]   BP Temp Temp Source Pulse Resp SpO2 Height Weight   (!) 162/81 98.2 °F (36.8 °C) Oral 66 20 97 % 5' 5\" (1.651 m) 136 lb (61.7 kg)       Physical Exam  Vitals signs and nursing note reviewed. Constitutional:       General: She is not in acute distress. Appearance: Normal appearance. She is not ill-appearing or toxic-appearing. HENT:      Head: Normocephalic and atraumatic. Comments: No connors sign, no raccoon eye, no scalp hematoma or palpable skull fracture     Nose: Nose normal. No congestion. Mouth/Throat:      Mouth: Mucous membranes are moist.   Eyes:      General:         Right eye: No discharge. Left eye: No discharge. Conjunctiva/sclera: Conjunctivae normal.   Neck:      Musculoskeletal: Normal range of motion. No muscular tenderness. Cardiovascular:      Rate and Rhythm: Normal rate and regular rhythm. Pulses: Normal pulses. Heart sounds: Normal heart sounds. No murmur. Pulmonary:      Effort: Pulmonary effort is normal. No respiratory distress. Breath sounds: Normal breath sounds. No wheezing. Abdominal:      General: Abdomen is flat. There is no distension. Palpations: Abdomen is soft. Tenderness: There is no abdominal tenderness. Musculoskeletal: Normal range of motion. General: No deformity or signs of injury. Comments: She does have some swelling in her left lower extremity which she states is not new. There is no point tenderness around the ankle or the foot. She has good pulses bilaterally in the lower extremities. There is no point tenderness at the ASIS, PSIS or left greater trochanter and she has normal range of motion in the left lower extremity. There is no tenderness of the left shoulder. Normal range of motion left upper extremity   Skin:     General: Skin is warm and dry. Capillary Refill: Capillary refill takes less than 2 seconds. Findings: No rash. Neurological:      General: No focal deficit present. Mental Status: She is alert and oriented to person, place, and time. Mental status is at baseline. Sensory: No sensory deficit. Motor: No weakness. Comments: Speaking normally. No facial asymmetry. Moving all 4 extremities. Gait testing deferred   Psychiatric:         Mood and Affect: Mood normal.         EMERGENCY DEPARTMENT COURSE and DIFFERENTIAL DIAGNOSIS/MDM:   Vitals:    Vitals:    10/23/20 0933   BP: (!) 162/81   Pulse: 66   Resp: 20   Temp: 98.2 °F (36.8 °C)   TempSrc: Oral   SpO2: 97%   Weight: 61.7 kg (136 lb)   Height: 5' 5\" (1.651 m)       Patient presents to the emergency department with the complaint described above. Physical exam reveals slightly elevated blood pressure but otherwise vitals are normal, she is nontoxic and she is resting comfortable with no signs of significant head injury. However, based on her age, her presentation and Nexus criteria I will get CT scan of the head without contrast.  She does not need any imaging of her shoulder, back or hip, there is no evidence of new trauma to her left ankle, however, because she had surgical correction and has some concern for reinjury we are going to go ahead and get an x-ray. I offered her Tylenol but she does not need any medication at this time      DIAGNOSTIC RESULTS     LABS:  Labs Reviewed - No data to display    All other labs were within normal range or not returned as of this dictation. RADIOLOGY:  CT HEAD WO CONTRAST   Final Result   No acute intracranial abnormality. XR ANKLE LEFT (MIN 3 VIEWS)   Final Result   No acute fracture or malalignment. Trimalleolar fracture fixation without evidence of hardware complication. ED Course as of Oct 23 1032   Fri Oct 23, 2020   1030 CT of the head is unremarkable, x-ray shows no malalignment or obvious new process in the left ankle. I discussed this with the patient.   She should continue all of the instruction from her orthopedic surgeon including physical therapy and follow-up    At this time the patient is without objective evidence of an acute process requiring hospitalization or inpatient management. They have remained hemodynamically stable and are stable for discharge with outpatient follow-up. Standard anticipatory guidance given to patient upon discharge. Have given them a specific time frame in which to follow-up and who to follow-up with. I have also advised them that they should return to the emergency department if they get worse, or not getting better or develop any new or concerning symptoms. Patient demonstrates understanding.    [TS]      ED Course User Index  [TS] Miranda Hein DO         PROCEDURES:  Unless otherwise noted below, none     Procedures    FINAL IMPRESSION      1.  Injury of head, initial encounter          DISPOSITION/PLAN   DISPOSITION Decision To Discharge 10/23/2020 10:31:24 AM      PATIENT REFERRED TO:  Citlali Miller DO  [address] [place] [address]  [phone]    In 1 week  As needed      DISCHARGE MEDICATIONS:  New Prescriptions    No medications on file          (Please note that portions of this note were completed with a voice recognition program.  Efforts were made to edit the dictations but occasionally words are mis-transcribed.)    Miranda Hein DO (electronically signed)  Board Certified Emergency Physician          Miranda Hein DO  10/23/20 1033

## 2020-10-27 ENCOUNTER — HOSPITAL ENCOUNTER (OUTPATIENT)
Dept: PHYSICAL THERAPY | Facility: CLINIC | Age: 85
Setting detail: THERAPIES SERIES
Discharge: HOME | End: 2020-10-27
Payer: MEDICARE

## 2020-10-27 PROCEDURE — 97110 THERAPEUTIC EXERCISES: CPT

## 2020-10-27 NOTE — CARE COORDINATION
Medicare Cap   [] Physical Therapy  [] Speech Therapy  [] Occupational therapy  *PT and Speech caps combine      $2010 Cap limit < kx modifier needed < $9280 requires pre-cert        Patient Name: Torri Watson  YOB: 1935    Note:  This is an estimate of charges billed.                 Date of [address] Name # units/ charge $$$ charge Daily Total Charge Ongoing Total $$$   8-7-20 eval  gait 1  1 82.82  23.06 105.88 105.88    9-8-20 gait 2 29.40  23.06 52.46 158.34   9-22-20 gait  2  29.40  23.06  52.46  210.80    10-6-20 Gait  therex  1  2 29.40  29.72  23.22 82.34 293.14    10-13-20  there ex  2  25.26  19.74  45  338.14   10-15-20  there ex  2  29.72+23.22  52.94  391.08   10-20-20  there ex  3  25.26+19.74+19.74  64.74  455.82   10-27-20 therex  3   25.26+19.74+19.74  64.74 520.56

## 2020-10-27 NOTE — FLOWSHEET NOTE
[x] [facility]  Outpatient Rehabilitation &  Therapy  [facility]   [place]: [phone]  [place]: [phone]      Physical Therapy Daily Treatment Note    Date:  10/27/2020  Patient Name:  Jonathan Bolden    :  1935  MRN: [id number]  Physician: Dr Rhodes Bones: Medicare   Medical Diagnosis: LLE Ankle ORIF trimalloelar fracture               Rehab Codes: N41.948J  Onset date:    20                        Next 's appt.: 20  Visit# / total visits: 9/10; Progress note for Medicare patient due at visit 10     Cancels/No Shows: 0/0    Subjective:    Pain:  [x] Yes  [] No Location: LLE ankle  Pain Rating: (0-10 scale) 4/10  Pain altered Tx:  [x] No  [] Yes  Action:  Comments: Pt arrives today reporting she took a fall when she was at home on 10-23-20, went to sit in the wheelchair but didn't have it locked. She missed and fell back on her buttock and left hip and her left shoulder/neck. Went to ER, CT of the head is unremarkable, x-ray shows no malalignment or obvious new process in the left ankle. Today arrives with 4/10 pain in the ankle, mild pain in the right shoulder, neck as well. Objective:    Precautions:  Written on 20:  Physical Therapy/Home exercises       Precautions: start at 10 wks post-op  (in CAM boot when ambulating) 50% Partial Wb'ing x 2 weeks, then 75% Partial Wb'ing x 2 weeks, then WBAT if pt is comfortable     Addition to orders for PT on 20: gait training, range of motion, and gentle strengthening.       Gait, ROM, gentle strengthening  WBAT   Exercise     LLE Ankle ORIF trimalloelar fracture Reps/ Time Weight/ Level Comments   Nustep L2 x10'               *4 way ankle AROM  x20      *Towel curls 3'            4 way ankle isometrics  5\"x20 GENTLE     Toe yoga 10x2 ea      Calf towel S Gentle  30\"x5     BAPS  seated x20  Side to side    MOBO range x20 2/4 setting          // bars: Bilat all      Hip flexion x20     Knee flexion x20     Hip abd  x20     Hip ext x20     Mini-squat x20           TGym squats L12 x20       LLE AROM Ankle:  DF -19  PF 25 (10 total mobility)  Inv 8  Ever 5    LLE ankle fig 8: 52.5 cm     Treatment Charges: Mins Units   []  Modalities     [x]  Ther Exercise 45 3   [x]  Manual Therapy     []  Ther Activities     []  Aquatics     []  Vasocompression     []  Gait     Total Treatment time 45 3       Assessment: [x] Progressing toward goals. Pt reports mild increase in her overall pain in the foot and ankle, feels more tightness in the calf today. Also reports through the session that she is having more pain in her head and neck from the fall. Discussed with patient that she should follow-up with Dr Colt Theodore and with her PCP. [] No change. [] Other:  [x] Patient would continue to benefit from skilled physical therapy services in order to: safely progress WB and add strength training once ordered from Dr Colt Theodore. NEW STG: (to be met in 6 treatments)  1. Educate patient on proper use of DME and WB progression ONGOING  2. Patient to perform transfers and weight bearing status independent without assistance MET, ongoing with updates to WB status  3. Independent with Home Exercise Programs NOT MET   4. Demonstrate Knowledge of fall prevention ONGOING     NEW LTG: To be met in 10 visits    1. Increase LE strength to 5/5 WNLs to ease mobility  2. Increase flexibility/ROM to WNLs bilat      Frequency:  1-2 x/week for 10 visits     Pt. Education:  [x] Yes  [] No  [x] Reviewed Prior HEP/Ed  Method of Education: [x] Verbal  [x] Demo  [] Written  Comprehension of Education:  [x] Verbalizes understanding. [x] Demonstrates understanding. [] Needs review. [] Demonstrates/verbalizes HEP/Ed previously given. Plan: [] Continue current frequency toward long and short term goals.     [x] Specific Instructions for subsequent treatments: advance WB as protocol allows Time In: 1000     Time Out: 1054     Electronically signed by:  Jadyn Jones PT

## 2020-10-28 ENCOUNTER — OFFICE VISIT (OUTPATIENT)
Dept: ORTHOPEDIC SURGERY | Age: 85
End: 2020-10-28
Payer: MEDICARE

## 2020-10-28 VITALS — BODY MASS INDEX: 22.66 KG/M2 | WEIGHT: 136 LBS | HEIGHT: 65 IN | TEMPERATURE: 96.8 F | RESPIRATION RATE: 15 BRPM

## 2020-10-28 PROCEDURE — G8484 FLU IMMUNIZE NO ADMIN: HCPCS | Performed by: ORTHOPAEDIC SURGERY

## 2020-10-28 PROCEDURE — 99213 OFFICE O/P EST LOW 20 MIN: CPT | Performed by: ORTHOPAEDIC SURGERY

## 2020-10-28 PROCEDURE — 1123F ACP DISCUSS/DSCN MKR DOCD: CPT | Performed by: ORTHOPAEDIC SURGERY

## 2020-10-28 PROCEDURE — 1036F TOBACCO NON-USER: CPT | Performed by: ORTHOPAEDIC SURGERY

## 2020-10-28 PROCEDURE — G8420 CALC BMI NORM PARAMETERS: HCPCS | Performed by: ORTHOPAEDIC SURGERY

## 2020-10-28 PROCEDURE — G8427 DOCREV CUR MEDS BY ELIG CLIN: HCPCS | Performed by: ORTHOPAEDIC SURGERY

## 2020-10-28 PROCEDURE — 1090F PRES/ABSN URINE INCON ASSESS: CPT | Performed by: ORTHOPAEDIC SURGERY

## 2020-10-28 PROCEDURE — 4040F PNEUMOC VAC/ADMIN/RCVD: CPT | Performed by: ORTHOPAEDIC SURGERY

## 2020-10-28 PROCEDURE — G8400 PT W/DXA NO RESULTS DOC: HCPCS | Performed by: ORTHOPAEDIC SURGERY

## 2020-10-28 NOTE — PROGRESS NOTES
(post-traumatic stress disorder), and Vitiligo. Past Surgical History:    She  has a past surgical history that includes Hysterectomy; Dilation and curettage of uterus; Thyroid surgery; and Ankle fracture surgery (Left, 6/23/2020). Current Medications:     Current Outpatient Medications:     Handicap Placard [facility] by Does not apply route Expires in 3 months, Disp: 1 each, Rfl: 0    ALPRAZolam (XANAX) 0.25 MG tablet, Take 0.25 mg by mouth 2 times daily as needed. , Disp: , Rfl:     aspirin 325 MG EC tablet, Take 1 tablet by mouth daily, Disp: 42 tablet, Rfl: 0    aspirin 325 MG EC tablet, Take 1 tablet by mouth daily, Disp: 42 tablet, Rfl: 0    ondansetron (ZOFRAN) 4 MG tablet, Take 1 tablet by mouth every 12 hours as needed for Nausea or Vomiting, Disp: 20 tablet, Rfl: 0    docusate sodium (COLACE) 100 MG capsule, Take 1 capsule by mouth 2 times daily as needed for Constipation, Disp: 20 capsule, Rfl: 0    famotidine (PEPCID) 40 MG tablet, Take 40 mg by mouth nightly, Disp: , Rfl:     vitamin B-12 (CYANOCOBALAMIN) 1000 MCG tablet, Take 1,000 mcg by mouth daily, Disp: , Rfl:     hydrochlorothiazide (HYDRODIURIL) 25 MG tablet, take 1 tablet by mouth once daily, Disp: 90 tablet, Rfl: 0    levothyroxine (SYNTHROID) 25 MCG tablet, take 1 tablet by mouth once daily, Disp: 30 tablet, Rfl: 0    oxybutynin (DITROPAN XL) 5 MG CR tablet, Take 1 tablet by mouth daily, Disp: 90 tablet, Rfl: 3    busPIRone (BUSPAR) 10 MG tablet, Take 10 mg by mouth 2 times daily , Disp: , Rfl: 0    triamcinolone (KENALOG) 0.1 % cream, 2 times daily as needed (ezcema) , Disp: , Rfl: 0    buPROPion (WELLBUTRIN XL) 300 MG XL tablet, Take 300 mg by mouth every morning , Disp: , Rfl:     traZODone (DESYREL) 100 MG tablet, Take 100 mg by mouth nightly , Disp: , Rfl:     fluticasone (FLONASE) 50 MCG/ACT nasal spray, 2 sprays by Nasal route daily, Disp: 1 Bottle, Rfl: 3     Allergies:    Ampicillin; Erythromycin;  Doxycycline; Paroxetine; Paroxetine hcl; Penicillins; and Sulfa antibiotics    Family History:  family history includes High Blood Pressure in her mother; Stroke in her mother. Social History:   Social History     Occupational History    Not on file   Tobacco Use    Smoking status: Never Smoker    Smokeless tobacco: Never Used   Substance and Sexual Activity    Alcohol use: Yes     Comment: occas    Drug use: No    Sexual activity: Not Currently       OBJECTIVE:  Temp 96.8 °F (36 °C)   Resp 15   Ht 5' 5\" (1.651 m)   Wt 136 lb (61.7 kg)   BMI 22.63 kg/m²    Psych: alert and oriented to person, time, and place  Cardio:  well perfused extremities  Resp:  normal respiratory effort  Skin:  no cyanosis  Hem/lymph:  no lymphedema  Neuro:  sensation to light touch unchanged since last visit  Musculoskeletal:    MUSCULOSKELETAL (affected lower extremity):  Vascular: Toes warm and well perfused, compartments soft/compressible, moderate swelling of ankle/foot. Skin:  Intact over foot/ankle, without rash/lesions/AV malformations. Prior incisions well-healed. Motion: Able to wiggle toes   -Range of motion not tested due to pain  -No tenderness at knee or proximal leg   -Tenderness to palpation: Diffusely throughout the lateral hindfoot, mild      RADIOLOGY:   10/20/2020 FINDINGS:  Three weightbearing views (AP, Mortise, and Lateral) of the left ankle and 3 weightbearing views (AP, oblique, and lateral) of the left foot were obtained in the office today and reviewed, revealing intact hardware status post ORIF of trimalleolar ankle fracture. Interval healing noted without interval displacement.   Questionable healing of the medial malleolus.     IMPRESSION:  Ankle fracture as above s/p ORIF.      Electronically signed by Laila Craig MD       ASSESSMENT AND PLAN:  Calvin Youssef was seen today for Ankle Pain (Left Ankle)  The encounter diagnosis was Closed trimalleolar fracture of left ankle with routine healing, subsequent encounter. Body mass index is 22.63 kg/m². She has a history of a left trimalleolar ankle fracture status post ORIF in June 2020, with a history of a new ankle sprain sustained on 10/22/2020. Notably, she has a history of anxiety/depression/PTSD, anemia, GERD, hypertension, hypothyroidism, and insomnia    I had a long discussion today with the patient about the likely diagnosis and its natural history, physical exam and imaging findings, as well as various treatment options in detail. Orders/referrals were placed as below at today's visit. She was placed into a lace up ankle brace, and she was referred to physical therapy to continue her general strengthening, gait training, range of motion, and to begin weaning out of the cam boot over the next 1 to 2 weeks into the lace up ankle brace. For her swelling, she was ordered a custom zippered compression sock. All questions were answered and the patient agrees with the above plan. The patient will return to clinic in 8 weeks with repeat left ankle x-rays. At the patient's next visit, depending on how the patient is doing and/or new imaging/labs results, we may consider the following options:    []  Orthotic (OTC)     []  Orthotic (custom)          []  Rocker bottom shoes     []  Brace (OTC)        []  Brace (custom)             []  CAM boot        []  Night splint         []  Heel cups        []  Strap      []  Toe sleeves/splints    []  PT:                     [x]  Wean out of immobilization   [x]  Advance activity       []  Topical               []  NSAIDs          []  Vanda         []  Referral:         []  Stress xrays       []  CT         []  MRI        []  Injection:         []  Consider OR      []  Pick OR date    Return in about 8 weeks (around 12/23/2020). No orders of the defined types were placed in this encounter.     Orders Placed This Encounter   Procedures    Ambulatory referral to Physical Therapy     Referral Priority:

## 2020-10-29 ENCOUNTER — HOSPITAL ENCOUNTER (OUTPATIENT)
Dept: PHYSICAL THERAPY | Facility: CLINIC | Age: 85
Setting detail: THERAPIES SERIES
Discharge: HOME | End: 2020-10-29
Payer: MEDICARE

## 2020-10-29 PROCEDURE — 97110 THERAPEUTIC EXERCISES: CPT

## 2020-10-29 NOTE — FLOWSHEET NOTE
total mobility)  Inv 14  Ever 8    LLE Ankle MMT  DF 3+  PF 4-  Inv 3+  Ever 3+    LLE ankle fig 8: 52 cm     Treatment Charges: Mins Units   []  Modalities     [x]  Ther Exercise 45 3   [x]  Manual Therapy     []  Ther Activities     []  Aquatics     []  Vasocompression     []  Gait     Total Treatment time 45 3       Assessment: [x] Progressing toward goals. Pt reports no increase in pain during session but difficulty with ex'       [] No change. [] Other:  [x] Patient would continue to benefit from skilled physical therapy services in order to: safely progress WB and add strength training once ordered from Dr Randy Dill. NEW STG: (to be met in 6 treatments)  1. Educate patient on proper use of DME and WB progression ONGOING  2. Patient to perform transfers and weight bearing status independent without assistance MET, ongoing with updates to WB status still progressing  3. Independent with Home Exercise Programs ONGOING  4. Demonstrate Knowledge of fall prevention ONGOING    NEW LTG: To be met in 10 visits    1. Increase LE strength to 5/5 WNLs to ease mobility ONGOING   2. Increase flexibility/ROM to WNLs bilat ONGOING, NOT MET   3. Improve score on LEFS from 43/80 to 50/80 or better NEW GOAL     Frequency:  1-2 x/week for 10 visits     Pt. Education:  [x] Yes  [] No  [x] Reviewed Prior HEP/Ed  Method of Education: [x] Verbal  [x] Demo  [] Written  Comprehension of Education:  [x] Verbalizes understanding. [x] Demonstrates understanding. [] Needs review. [] Demonstrates/verbalizes HEP/Ed previously given. Plan: [] Continue current frequency toward long and short term goals.     [x] Specific Instructions for subsequent treatments: advance WB as protocol allows          Time In: 1300    Time Out: 7975    Electronically signed by:  Alvy Spurling, PT

## 2020-10-29 NOTE — PROGRESS NOTES
An ankle brace was ordered and placed on the patient for pain control and stabilization. Patient is ambulatory with weakness and instability therefore an ankle brace will help with the weakness, stabilization and pain control. The brace will improve ADL's.

## 2020-11-03 ENCOUNTER — HOSPITAL ENCOUNTER (OUTPATIENT)
Dept: PHYSICAL THERAPY | Facility: CLINIC | Age: 85
Setting detail: THERAPIES SERIES
Discharge: HOME | End: 2020-11-03
Payer: MEDICARE

## 2020-11-03 PROCEDURE — 97110 THERAPEUTIC EXERCISES: CPT

## 2020-11-03 NOTE — FLOWSHEET NOTE
[x] [facility]  Outpatient Rehabilitation &  Therapy  [facility]   [place]: [phone]  F: [phone]      Physical Therapy Daily Treatment Note     Date:  11/3/2020  Patient Name:  Riley Reeves    :  1935  MRN: [id number]  Physician: Dr Jose C Tavarez: Medicare   Medical Diagnosis: LLE Ankle ORIF trimalloelar fracture               Rehab Codes: R06.238Z  Onset date:    20                        Next Dr's appt.: 20  Visit# / total visits: ; Progress note for Medicare patient due at visit 10     Cancels/No Shows: 0/0    Subjective:    Pain:  [x] Yes  [] No Location: LLE ankle  Pain Rating: (0-10 scale) 4/10  Pain altered Tx:  [x] No  [] Yes  Action:  Comments: Pt arrives today with generalized soreness left over from recent fall. Patient states her scar is more tender than the other areas of her ankle. Patient also stated she is having difficulties putting on the compression sock. Per Dr Karina Rushing visit, pt to begin transition to ankle brace lace-up and compression stockings with zipper. Objective:    Precautions: Addition to orders for PT on 20: gait training, range of motion, and gentle strengthening.   Orders 10-28-20:   Lace up ankle brace weaning out of the cam boot over the next 1-2 weeks into brace  PT to continue her general strengthening, gait training, range of motion     Gait, ROM, gentle strengthening  WBAT   Exercise     LLE Ankle ORIF trimalloelar fracture Reps/ Time Weight/ Level Comments   Nustep L2 x10'               *4 way ankle AROM  x20      *Towel curls 3'            4 way ankle Tband  x20 peach    Toe yoga 10x2 ea      Calf towel S Gentle  30\"x5     BAPS  seated x20 L1 Side to side ,DF/PF   MOBO range x20 2/4 setting          // bars: Bilat all      Hip flexion x20     Knee flexion x20     Hip abd  x20     Hip ext x20     Mini-squat x20     Weight shift staggered stance x20 Added 11/3         TGym squats L12 x20             Treatment Charges: Mins Units   []  Modalities     [x]  Ther Exercise 45 3   []  Manual Therapy     []  Ther Activities     []  Aquatics     []  Vasocompression     []  Gait     Total Treatment time 45 3       Assessment: [x] Progressing toward goals. Pt reports no pain with exercises but experiences difficulties with inversion and eversion during BAPS board and Tband exercises. Informed patient to bring lace up brace and compression sock to next session to allow for weaning off CAM boot. [] No change. [] Other:  [x] Patient would continue to benefit from skilled physical therapy services in order to: safely progress to gait training and gentle strength training       NEW STG: (to be met in 6 treatments)  1. Educate patient on proper use of DME and WB progression ONGOING  2. Patient to perform transfers and weight bearing status independent without assistance MET, ongoing with updates to WB status still progressing  3. Independent with Home Exercise Programs ONGOING  4. Demonstrate Knowledge of fall prevention ONGOING    NEW LTG: To be met in 10 visits    1. Increase LE strength to 5/5 WNLs to ease mobility ONGOING   2. Increase flexibility/ROM to WNLs bilat ONGOING, NOT MET   3. Improve score on LEFS from 43/80 to 50/80 or better NEW GOAL     Frequency:  1-2 x/week for 10 visits     Pt. Education:  [x] Yes  [] No  [x] Reviewed Prior HEP/Ed  Method of Education: [x] Verbal  [x] Demo  [] Written  Comprehension of Education:  [x] Verbalizes understanding. [x] Demonstrates understanding. [] Needs review. [] Demonstrates/verbalizes HEP/Ed previously given. Plan: [] Continue current frequency toward long and short term goals.     [x] Specific Instructions for subsequent treatments: advance WB as protocol allows          Time In: 10:00 AM    Time Out: 10:55 AM    Electronically signed by:  Lea Hanson PTA

## 2020-11-05 ENCOUNTER — HOSPITAL ENCOUNTER (OUTPATIENT)
Dept: PHYSICAL THERAPY | Facility: CLINIC | Age: 85
Setting detail: THERAPIES SERIES
Discharge: HOME | End: 2020-11-05
Payer: MEDICARE

## 2020-11-05 PROCEDURE — 97110 THERAPEUTIC EXERCISES: CPT

## 2020-11-05 NOTE — CARE COORDINATION
Medicare Cap   [x] Physical Therapy  [] Speech Therapy  [] Occupational therapy  *PT and Speech caps combine      $2010 Cap limit < kx modifier needed < $5363 requires pre-cert        Patient Name: Ly Stevenson  YOB: 1935    Note:  This is an estimate of charges billed.                 Date of [address] Name # units/ charge $$$ charge Daily Total Charge Ongoing Total $$$   8--7-20 eval  gait 1  1 82.82  23.06 105.88 105.88    9-8-20 gait 2 29.40  23.06 52.46 158.34   9-22-20 gait  2  29.40  23.06  52.46  210.80    10-6-20 Gait  therex  1  2 29.40  29.72  23.22 82.34 293.14    10-13-20  there ex  2  25.26  19.74  45  338.14   10-15-20  there ex  2  29.72+23.22  52.94  391.08   10-20-20  there ex  3  25.26+19.74+19.74  64.74  455.82   10-27-20 therex  3   25.26+19.74+19.74  64.74 520.56    10-29-20 therex  3     64.74  585.30    11/3/2020  therex  3  25.26+19.74+19.74  64.74  650.04    11-5-20  therex    3   25.26+19.74+19.74  64.74  714.78

## 2020-11-05 NOTE — FLOWSHEET NOTE
[x] [facility]  Outpatient Rehabilitation &  Therapy  [facility]   [place]: [phone]  F: [phone]      Physical Therapy Daily Treatment Note     Date:  2020  Patient Name:  Arnold Lovelace    :  1935  MRN: [id number]  Physician: Dr Schmidt Search: Medicare   Medical Diagnosis: LLE Ankle ORIF trimalloelar fracture               Rehab Codes: N94.462U  Onset date:    20                        Next Dr's appt.: 20  Visit# / total visits: ; Progress note for Medicare patient due at visit 10     Cancels/No Shows: 0/0    Subjective:    Pain:  [x] Yes  [] No Location: LLE ankle  Pain Rating: (0-10 scale) 4/10  Pain altered Tx:  [x] No  [] Yes  Action:  Comments: Pt arrives today with 4/10 pain in the ankle. She has starting wearing the compression stockings and needs help with the ankle brace on how to wear it. Per Dr Ramy Torres visit, pt to begin transition to ankle brace lace-up and compression stockings with zipper. Objective:    Precautions: Addition to orders for PT on 20: gait training, range of motion, and gentle strengthening.   Orders 10-28-20:   Lace up ankle brace weaning out of the cam boot over the next 1-2 weeks into brace  PT to continue her general strengthening, gait training, range of motion     Gait, ROM, gentle strengthening  WBAT   Exercise     LLE Ankle ORIF trimalloelar fracture Reps/ Time Weight/ Level Comments   Nustep L4 x10'               4 way ankle Tband  x20 orange    Toe yoga 10x2 ea      Calf towel S Gentle  30\"x5     BAPS seated x20 L1 Side to side ,DF/PF   MOBO range x20 2/4 setting          // bars:      Hip flexion x20     Mini-squat x20     4 way hip TBand  x15 orange OKC   lunges 2x10     TGym squats L12 x20             Treatment Charges: Mins Units   []  Modalities     [x]  Ther Exercise 42 3   []  Manual Therapy     []  Ther Activities     []  Aquatics     [] Vasocompression     []  Gait     Total Treatment time 42 3       Assessment: [x] Progressing toward goals. Educated patient on use of ASO and how to wear it. Discussed with patient need to start using brace and compression stocking at home daily and to get out of the boot for gait at home. [] No change. [] Other:  [x] Patient would continue to benefit from skilled physical therapy services in order to: safely progress to gait training and gentle strength training       NEW STG: (to be met in 6 treatments)  1. Educate patient on proper use of DME and WB progression ONGOING  2. Patient to perform transfers and weight bearing status independent without assistance MET, ongoing with updates to WB status still progressing  3. Independent with Home Exercise Programs ONGOING  4. Demonstrate Knowledge of fall prevention ONGOING    NEW LTG: To be met in 10 visits    1. Increase LE strength to 5/5 WNLs to ease mobility ONGOING   2. Increase flexibility/ROM to WNLs bilat ONGOING, NOT MET   3. Improve score on LEFS from 43/80 to 50/80 or better NEW GOAL     Frequency:  1-2 x/week for 10 visits     Pt. Education:  [x] Yes  [] No  [x] Reviewed Prior HEP/Ed  Method of Education: [x] Verbal  [x] Demo  [] Written  Comprehension of Education:  [x] Verbalizes understanding. [x] Demonstrates understanding. [] Needs review. [] Demonstrates/verbalizes HEP/Ed previously given. Plan: [] Continue current frequency toward long and short term goals.     [x] Specific Instructions for subsequent treatments: advance WB as protocol allows          Time In: 1000    Time Out: 0765    Electronically signed by:  Ally Barbour PT

## 2020-11-10 ENCOUNTER — HOSPITAL ENCOUNTER (OUTPATIENT)
Dept: PHYSICAL THERAPY | Facility: CLINIC | Age: 85
Setting detail: THERAPIES SERIES
Discharge: HOME | End: 2020-11-10
Payer: MEDICARE

## 2020-11-10 PROCEDURE — 97110 THERAPEUTIC EXERCISES: CPT

## 2020-11-10 NOTE — FLOWSHEET NOTE
[x] [facility]  Outpatient Rehabilitation &  Therapy  [facility]   [place]: [phone]  F: [phone]      Physical Therapy Daily Treatment Note     Date:  11/10/2020  Patient Name:  Roni Pain    :  1935  MRN: [id number]  Physician: Dr Titus Grain: Medicare   Medical Diagnosis: LLE Ankle ORIF trimalloelar fracture               Rehab Codes: P15.879L  Onset date:    20                        Next Dr's appt.: 20  Visit# / total visits: ; Progress note for Medicare patient due at visit 10     Cancels/No Shows: 0/0    Subjective:    Pain:  [x] Yes  [] No Location: LLE ankle  Pain Rating: (0-10 scale) 2/10  Pain altered Tx:  [x] No  [] Yes  Action:  Comments: Pt arrives today with 2/10 pain in the ankle. She arrives in CAM boot with walker with plans to switch to shoe and ankle brace. Objective:    Precautions:    Orders 10-28-20:   Lace up ankle brace weaning out of the cam boot over the next 1-2 weeks into brace  PT to continue her general strengthening, gait training, range of motion     Gait, ROM, gentle strengthening  WBAT   Exercise     LLE Ankle ORIF trimalloelar fracture Reps/ Time Weight/ Level Comments   Nustep L4 x10'               4 way ankle Tband  x20 orange    Toe yoga 10x2 ea      Calf towel S Gentle  30\"x5           MOBO range x20 2/4 and 1/3 setting Standing          // bars:      Hip flexion x20     Mini-squat x20     4 way hip TBand  x15 orange OKC   lunges 2x10  bilat    Step ups 4\"x10     Step downs 4\"x10     SB Calf S 10\"x10  Lowest angle          TGym HR  L12 x20  Lowest setting   TGym squats L12 x20             Treatment Charges: Mins Units   []  Modalities     [x]  Ther Exercise 42 3   []  Manual Therapy     []  Ther Activities     []  Aquatics     []  Vasocompression     []  Gait     Total Treatment time 42 3       Assessment: [x] Progressing toward goals.  Pt switched from CAM boot to ASO and shoe while in the clinic after she arrived today, good understanding of proper use and she was able to put it on ok. Discussed with patient need to start using brace and compression stocking at home daily and to get out of the boot for gait at home. She has not been doing much of it yet on her own. Progressed throughout the session today with her shoe and ankle brace, patient tolerated all ex's well with 4/10 pain at worst noted. She needed cues throughout for proper form with WB ex's. Discussed performing WB ex's at home. [] No change. [] Other:  [x] Patient would continue to benefit from skilled physical therapy services in order to: safely progress to gait training and gentle strength training       NEW STG: (to be met in 6 treatments)  1. Educate patient on proper use of DME and WB progression ONGOING  2. Patient to perform transfers and weight bearing status independent without assistance MET, ongoing with updates to WB status still progressing  3. Independent with Home Exercise Programs ONGOING  4. Demonstrate Knowledge of fall prevention ONGOING    NEW LTG: To be met in 10 visits    1. Increase LE strength to 5/5 WNLs to ease mobility ONGOING   2. Increase flexibility/ROM to WNLs bilat ONGOING, NOT MET   3. Improve score on LEFS from 43/80 to 50/80 or better NEW GOAL     Frequency:  1-2 x/week for 10 visits     Pt. Education:  [x] Yes  [] No  [x] Reviewed Prior HEP/Ed  Method of Education: [x] Verbal  [x] Demo  [] Written  Comprehension of Education:  [x] Verbalizes understanding. [x] Demonstrates understanding. [] Needs review. [] Demonstrates/verbalizes HEP/Ed previously given. Plan: [] Continue current frequency toward long and short term goals.     [x] Specific Instructions for subsequent treatments: advance WB as protocol allows          Time In: 1000    Time Out: 7554    Electronically signed by:  Primitivo Fountain, PT

## 2020-11-10 NOTE — CARE COORDINATION
Medicare Cap   [x] Physical Therapy  [] Speech Therapy  [] Occupational therapy  *PT and Speech caps combine      $2010 Cap limit < kx modifier needed < $2507 requires pre-cert        Patient Name: Riley Reeves  YOB: 1935    Note:  This is an estimate of charges billed.                 Date of [address] Name # units/ charge $$$ charge Daily Total Charge Ongoing Total $$$   8--7-20 eval  gait 1  1 82.82  23.06 105.88 105.88    9-8-20 gait 2 29.40  23.06 52.46 158.34   9-22-20 gait  2  29.40  23.06  52.46  210.80    10-6-20 Gait  therex  1  2 29.40  29.72  23.22 82.34 293.14    10-13-20  there ex  2  25.26  19.74  45  338.14   10-15-20  there ex  2  29.72+23.22  52.94  391.08   10-20-20  there ex  3  25.26+19.74+19.74  64.74  455.82   10-27-20 therex  3   25.26+19.74+19.74  64.74 520.56    10-29-20 therex  3     64.74  585.30    11/3/2020  therex  3  25.26+19.74+19.74  64.74  650.04    11-5-20  therex    3   25.26+19.74+19.74  64.74  714.78    11-10-20 therex   3    25.26+19.74+19.74  64.74  779.52

## 2020-11-12 ENCOUNTER — HOSPITAL ENCOUNTER (OUTPATIENT)
Dept: PHYSICAL THERAPY | Facility: CLINIC | Age: 85
Setting detail: THERAPIES SERIES
Discharge: HOME | End: 2020-11-12
Payer: MEDICARE

## 2020-11-12 PROCEDURE — 97110 THERAPEUTIC EXERCISES: CPT

## 2020-11-12 NOTE — CARE COORDINATION
Medicare Cap   [x] Physical Therapy  [] Speech Therapy  [] Occupational therapy  *PT and Speech caps combine      $2010 Cap limit < kx modifier needed < $3554 requires pre-cert        Patient Name: Ly Stevenson  YOB: 1935    Note:  This is an estimate of charges billed.                 Date of [address] Name # units/ charge $$$ charge Daily Total Charge Ongoing Total $$$   8--7-20 eval  gait 1  1 82.82  23.06 105.88 105.88    9-8-20 gait 2 29.40  23.06 52.46 158.34   9-22-20 gait  2  29.40  23.06  52.46  210.80    10-6-20 Gait  therex  1  2 29.40  29.72  23.22 82.34 293.14    10-13-20  there ex  2  25.26  19.74  45  338.14   10-15-20  there ex  2  29.72+23.22  52.94  391.08   10-20-20  there ex  3  25.26+19.74+19.74  64.74  455.82   10-27-20 therex  3   25.26+19.74+19.74  64.74 520.56    10-29-20 therex  3     64.74  585.30    11/3/2020  therex  3  25.26+19.74+19.74  64.74  650.04    11-5-20  therex    3   25.26+19.74+19.74  64.74  714.78    11-10-20 therex   3    25.26+19.74+19.74  64.74  779.52    11-12-20 therex    3 25.26+19.74+19.74  64.74   844.26

## 2020-11-12 NOTE — FLOWSHEET NOTE
[x] [facility]  Outpatient Rehabilitation &  Therapy  [facility]   [place]: [phone]  F: [phone]      Physical Therapy Daily Treatment Note     Date:  2020  Patient Name:  Winsome Mason    :  1935  MRN: [id number]  Physician: Dr Loreta Pascual: Medicare   Medical Diagnosis: LLE Ankle ORIF trimalloelar fracture               Rehab Codes: D97.625G  Onset date:    20                        Next 's appt.: 20  Visit# / total visits: ; Progress note for Medicare patient due at visit 10     Cancels/No Shows: 0/0    Subjective:    Pain:  [x] Yes  [] No Location: LLE ankle  Pain Rating: (0-10 scale) 2/10  Pain altered Tx:  [x] No  [] Yes  Action:  Comments: Pt arrives today with 2/10 pain in the ankle. She arrives today in her shoe and ankle brace, reports she has been using it at home with no problems. She does feel the shoe may be a little tight with the compression stocking and ASO together.        Objective:    Precautions:    Orders 10-28-20:   Lace up ankle brace weaning out of the cam boot over the next 1-2 weeks into brace  PT to continue her general strengthening, gait training, range of motion     Gait, ROM, gentle strengthening  WBAT   Exercise     LLE Ankle ORIF trimalloelar fracture Reps/ Time Weight/ Level Comments   Nustep L4 x10'               4 way ankle Tband  x20 Green     Toe yoga 10x2 ea      Calf towel S Gentle  30\"x5           MOBO range x20 2/4 and 1/3 setting Standing          // bars:      Hip flexion x20     Mini-squat x20     4 way hip TBand  x15 orange OKC   lunges 2x10  bilat    Step ups 4\"x10     Step downs 4\"x10     SB Calf S 30\"x3  Lowest angle          TGym HR  L16 x20  Lowest setting   TGym squats L16 x20             Treatment Charges: Mins Units   []  Modalities     [x]  Ther Exercise 40 3   []  Manual Therapy     []  Ther Activities     []  Aquatics     []  Vasocompression []  Gait     Total Treatment time 40 3       Assessment: [x] Progressing toward goals. Pt is doing well in the clinic with ASO and compression sock, ambulating with rolling walker well. Progressed throughout the session today with her shoe and ankle brace, patient tolerated all ex's well with 3/10 pain with some of the side to side motion. She needed cues throughout for proper form with WB ex's. Discussed performing WB ex's at home. [] No change. [] Other:  [x] Patient would continue to benefit from skilled physical therapy services in order to: safely progress to gait training and gentle strength training       NEW STG: (to be met in 6 treatments)  1. Educate patient on proper use of DME and WB progression ONGOING  2. Patient to perform transfers and weight bearing status independent without assistance MET, ongoing with updates to WB status still progressing  3. Independent with Home Exercise Programs ONGOING  4. Demonstrate Knowledge of fall prevention ONGOING    NEW LTG: To be met in 10 visits    1. Increase LE strength to 5/5 WNLs to ease mobility ONGOING   2. Increase flexibility/ROM to WNLs bilat ONGOING, NOT MET   3. Improve score on LEFS from 43/80 to 50/80 or better NEW GOAL     Frequency:  1-2 x/week for 10 visits     Pt. Education:  [x] Yes  [] No  [x] Reviewed Prior HEP/Ed  Method of Education: [x] Verbal  [x] Demo  [] Written  Comprehension of Education:  [x] Verbalizes understanding. [x] Demonstrates understanding. [] Needs review. [] Demonstrates/verbalizes HEP/Ed previously given. Plan: [] Continue current frequency toward long and short term goals.     [x] Specific Instructions for subsequent treatments: advance WB as protocol allows          Time In: 9995   Time Out: 7813    Electronically signed by:  Brandi Cespedes, PT

## 2020-11-17 ENCOUNTER — HOSPITAL ENCOUNTER (OUTPATIENT)
Dept: PHYSICAL THERAPY | Facility: CLINIC | Age: 85
Setting detail: THERAPIES SERIES
Discharge: HOME | End: 2020-11-17
Payer: MEDICARE

## 2020-11-17 PROCEDURE — 97110 THERAPEUTIC EXERCISES: CPT

## 2020-11-17 NOTE — FLOWSHEET NOTE
[x] [facility]  Outpatient Rehabilitation &  Therapy  [facility]   [place]: [phone]  F: [phone]      Physical Therapy Daily Treatment Note     Date:  2020  Patient Name:  Kaz Felton    :  1935  MRN: [id number]  Physician: Dr Sera Alvarenga: Medicare   Medical Diagnosis: LLE Ankle ORIF trimalloelar fracture               Rehab Codes: O44.372Q  Onset date:    20                        Next 's appt.: 20  Visit# / total visits: 15/20; Progress note for Medicare patient due at visit 10     Cancels/No Shows: 0/0    Subjective:    Pain:  [x] Yes  [] No Location: LLE ankle  Pain Rating: (0-10 scale) 2/10  Pain altered Tx:  [x] No  [] Yes  Action:  Comments: Pt arrives today stating her ankle and foot feel good today. Objective:    Precautions:    Orders 10-28-20:   Lace up ankle brace weaning out of the cam boot over the next 1-2 weeks into brace  PT to continue her general strengthening, gait training, range of motion     Gait, ROM, gentle strengthening  WBAT   Exercise     LLE Ankle ORIF trimalloelar fracture Reps/ Time Weight/ Level Comments   Nustep L4 x10'               4 way ankle Tband  x20 Green     Toe yoga 10x2 ea      Calf towel S Gentle  30\"x5           MOBO range x20 2/4 and 1/3 setting Standing          // bars:      Mini-squat x20     4 way hip TBand  x15 Orange OKC   lunges 2x10  bilat    Step ups 4\"x10     Step downs 4\"x10     SB Calf S 30\"x3  Lowest angle          TGym HR  L16 x20  Lowest setting   TGym squats L16 x20             Treatment Charges: Mins Units   []  Modalities     [x]  Ther Exercise 35 2   []  Manual Therapy     []  Ther Activities     []  Aquatics     []  Vasocompression     []  Gait     Total Treatment time 35 2       Assessment: [x] Progressing toward goals. Pt completed all exercises listed above noting mild pain rating 2/10 post exercises.  Pt required minimal cueing to perform exercises correctly with good carryover post instruction. [] No change. [] Other:  [x] Patient would continue to benefit from skilled physical therapy services in order to: safely progress to gait training and gentle strength training       NEW STG: (to be met in 6 treatments)  1. Educate patient on proper use of DME and WB progression ONGOING  2. Patient to perform transfers and weight bearing status independent without assistance MET, ongoing with updates to WB status still progressing  3. Independent with Home Exercise Programs ONGOING  4. Demonstrate Knowledge of fall prevention ONGOING    NEW LTG: To be met in 10 visits    1. Increase LE strength to 5/5 WNLs to ease mobility ONGOING   2. Increase flexibility/ROM to WNLs bilat ONGOING, NOT MET   3. Improve score on LEFS from 43/80 to 50/80 or better NEW GOAL     Frequency:  1-2 x/week for 10 visits     Pt. Education:  [x] Yes  [] No  [x] Reviewed Prior HEP/Ed  Method of Education: [x] Verbal  [x] Demo  [] Written  Comprehension of Education:  [x] Verbalizes understanding. [x] Demonstrates understanding. [] Needs review. [] Demonstrates/verbalizes HEP/Ed previously given. Plan: [] Continue current frequency toward long and short term goals.     [x] Specific Instructions for subsequent treatments: advance WB as protocol allows          Time In: 8:20am     Time Out: 9:05am    Electronically signed by:  Yolette John PTA

## 2020-11-17 NOTE — CARE COORDINATION
Medicare Cap   [x] Physical Therapy  [] Speech Therapy  [] Occupational therapy  *PT and Speech caps combine      $2010 Cap limit < kx modifier needed < $9854 requires pre-cert        Patient Name: Rosita Alvarado  YOB: 1935    Note:  This is an estimate of charges billed.                 Date of [address] Name # units/ charge $$$ charge Daily Total Charge Ongoing Total $$$   8--7-20 eval  gait 1  1 82.82  23.06 105.88 105.88    9-8-20 gait 2 29.40  23.06 52.46 158.34   9-22-20 gait  2  29.40  23.06  52.46  210.80    10-6-20 Gait  therex  1  2 29.40  29.72  23.22 82.34 293.14    10-13-20  there ex  2  25.26  19.74  45  338.14   10-15-20  there ex  2  29.72+23.22  52.94  391.08   10-20-20  there ex  3  25.26+19.74+19.74  64.74  455.82   10-27-20 therex  3   25.26+19.74+19.74  64.74 520.56    10-29-20 therex  3     64.74  585.30    11/3/2020  therex  3  25.26+19.74+19.74  64.74  650.04    11-5-20  therex    3   25.26+19.74+19.74  64.74  714.78    11-10-20 therex   3    25.26+19.74+19.74  64.74  779.52    11-12-20 therex    3 25.26+19.74+19.74  64.74   844.26    11/17/20 therex PTA  2  25.26+19.74 45.00  889.26

## 2020-11-19 ENCOUNTER — HOSPITAL ENCOUNTER (OUTPATIENT)
Dept: PHYSICAL THERAPY | Facility: CLINIC | Age: 85
Setting detail: THERAPIES SERIES
Discharge: HOME | End: 2020-11-19
Payer: MEDICARE

## 2020-11-19 PROCEDURE — 97110 THERAPEUTIC EXERCISES: CPT

## 2020-11-19 NOTE — FLOWSHEET NOTE
[x] [facility]  Outpatient Rehabilitation &  Therapy  [facility]   [place]: [phone]  F: [phone]      Physical Therapy Daily Treatment Note     Date:  2020  Patient Name:  Eva Peters    :  1935  MRN: [id number]  Physician: Dr Michael Orozco: Medicare   Medical Diagnosis: LLE Ankle ORIF trimalloelar fracture               Rehab Codes: A51.047P  Onset date:    20                        Next Dr's appt.: 20  Visit# / total visits: ; Progress note for Medicare patient due at visit 10     Cancels/No Shows: 0/0    Subjective:    Pain:  [x] Yes  [] No Location: LLE ankle  Pain Rating: (0-10 scale) 4/10  Pain altered Tx:  [x] No  [] Yes  Action:  Comments: Pt arrives today with mild pain noted in the ankle, feels stiff and sore with activity. She had her family doctor consult today, reports       Objective:    Precautions:    Orders 10-28-20:   Lace up ankle brace weaning out of the cam boot over the next 1-2 weeks into brace  PT to continue her general strengthening, gait training, range of motion     Gait, ROM, gentle strengthening  WBAT   Exercise     LLE Ankle ORIF trimalloelar fracture Reps/ Time Weight/ Level Comments   Nustep L4 x10'               4 way ankle Tband  x20 Green     Toe yoga 10x2 ea      Calf towel S Gentle  30\"x5           MOBO range x20 2/4 and 1/3 setting Standing          // bars:      Mini-squat x20     4 way hip TBand  x15 Orange OKC   lunges 2x10  bilat    Step ups 6\"x10     Step downs 6\"x10     SB Calf S 30\"x3  Lowest angle          TGym HR  L16 x20  Lowest setting   TGym squats L16 x20         Treatment Charges: Mins Units   []  Modalities     [x]  Ther Exercise 48 3   []  Manual Therapy     []  Ther Activities     []  Aquatics     []  Vasocompression     []  Gait     Total Treatment time 48 3       Assessment: [x] Progressing toward goals.  Pt completed all exercises listed above noting mild pain rating 2/10 post exercises. Pt required minimal cueing to perform exercises correctly with good carryover post instruction. [] No change. [] Other:  [x] Patient would continue to benefit from skilled physical therapy services in order to: safely progress to gait training and gentle strength training       NEW STG: (to be met in 6 treatments)  1. Educate patient on proper use of DME and WB progression ONGOING  2. Patient to perform transfers and weight bearing status independent without assistance MET, ongoing with updates to WB status still progressing  3. Independent with Home Exercise Programs ONGOING  4. Demonstrate Knowledge of fall prevention ONGOING    NEW LTG: To be met in 10 visits    1. Increase LE strength to 5/5 WNLs to ease mobility ONGOING   2. Increase flexibility/ROM to WNLs bilat ONGOING, NOT MET   3. Improve score on LEFS from 43/80 to 50/80 or better NEW GOAL     Frequency:  1-2 x/week for 10 visits     Pt. Education:  [x] Yes  [] No  [x] Reviewed Prior HEP/Ed  Method of Education: [x] Verbal  [x] Demo  [] Written  Comprehension of Education:  [x] Verbalizes understanding. [x] Demonstrates understanding. [] Needs review. [] Demonstrates/verbalizes HEP/Ed previously given. Plan: [] Continue current frequency toward long and short term goals.     [x] Specific Instructions for subsequent treatments: advance WB as protocol allows          Time In: 1300    Time Out: 1400    Electronically signed by:  Kezia Hidalgo, PT

## 2020-11-19 NOTE — CARE COORDINATION
Medicare Cap   [x] Physical Therapy  [] Speech Therapy  [] Occupational therapy  *PT and Speech caps combine      $2010 Cap limit < kx modifier needed < $1765 requires pre-cert        Patient Name: Rosita Alvarado  YOB: 1935    Note:  This is an estimate of charges billed.                 Date of M[address] Name # units/ charge $$$ charge Daily Total Charge Ongoing Total $$$   8--7-20 eval  gait 1  1 82.82  23.06 105.88 105.88    9-8-20 gait 2 29.40  23.06 52.46 158.34   9-22-20 gait  2  29.40  23.06  52.46  210.80    10-6-20 Gait  therex  1  2 29.40  29.72  23.22 82.34 293.14    10-13-20  there ex  2  25.26  19.74  45  338.14   10-15-20  there ex  2  29.72+23.22  52.94  391.08   10-20-20  there ex  3  25.26+19.74+19.74  64.74  455.82   10-27-20 therex  3   25.26+19.74+19.74  64.74 520.56    10-29-20 therex  3     64.74  585.30    11/3/2020  therex  3  25.26+19.74+19.74  64.74  650.04    11-5-20  therex    3   25.26+19.74+19.74  64.74  714.78    11-10-20 therex   3    25.26+19.74+19.74  64.74  779.52    11-12-20 therex    3 25.26+19.74+19.74  64.74   844.26    11/17/20 therex PTA  2  25.26+19.74 45.00  889.26    11-19-20 Therex  PT   3  29.72+23.22+23.22 76.16  965.42

## 2020-11-24 ENCOUNTER — HOSPITAL ENCOUNTER (OUTPATIENT)
Dept: PHYSICAL THERAPY | Facility: CLINIC | Age: 85
Setting detail: THERAPIES SERIES
Discharge: HOME | End: 2020-11-24
Payer: MEDICARE

## 2020-11-24 PROCEDURE — 97110 THERAPEUTIC EXERCISES: CPT

## 2020-11-24 NOTE — CARE COORDINATION
Medicare Cap   [x] Physical Therapy  [] Speech Therapy  [] Occupational therapy  *PT and Speech caps combine      $2010 Cap limit < kx modifier needed < $1387 requires pre-cert        Patient Name: Arianna Regalado  YOB: 1935    Note:  This is an estimate of charges billed.                 Date of M[address] 63 Name # units/ charge $$$ charge Daily Total Charge Ongoing Total $$$   8--7-20 eval  gait 1  1 82.82  23.06 105.88 105.88    9-8-20 gait 2 29.40  23.06 52.46 158.34   9-22-20 gait  2  29.40  23.06  52.46  210.80    10-6-20 Gait  therex  1  2 29.40  29.72  23.22 82.34 293.14    10-13-20  there ex  2  25.26  19.74  45  338.14   10-15-20  there ex  2  29.72+23.22  52.94  391.08   10-20-20  there ex  3  25.26+19.74+19.74  64.74  455.82   10-27-20 therex  3   25.26+19.74+19.74  64.74 520.56    10-29-20 therex  3     64.74  585.30    11/3/2020  therex  3  25.26+19.74+19.74  64.74  650.04    11-5-20  therex    3   25.26+19.74+19.74  64.74  714.78    11-10-20 therex   3    25.26+19.74+19.74  64.74  779.52    11-12-20 therex    3 25.26+19.74+19.74  64.74   844.26    11/17/20 therex PTA  2  25.26+19.74 45.00  889.26    11-19-20 Therex  PT   3  29.72+23.22+23.22 76.16  965.42    11/24/20 therex PTA  2 25.26+19.74  45.00  1010.42

## 2020-11-24 NOTE — FLOWSHEET NOTE
[x] [facility]  Outpatient Rehabilitation &  Therapy  [facility]   [place]: [phone]  F: [phone]      Physical Therapy Daily Treatment Note     Date:  2020  Patient Name:  Shane Gould    :  1935  MRN: [id number]  Physician: Dr Fran Jaimes: Medicare   Medical Diagnosis: LLE Ankle ORIF trimalloelar fracture               Rehab Codes: W40.456U  Onset date:    20                        Next Dr's appt.: 20  Visit# / total visits: ; Progress note for Medicare patient due at visit 20     Cancels/No Shows: 0/0    Subjective:    Pain:  [x] Yes  [] No Location: LLE ankle  Pain Rating: (0-10 scale) 2/10  Pain altered Tx:  [x] No  [] Yes  Action:  Comments: Pt states mild pain and notices she feels numbness on lateral side of L foot. Objective:    Precautions:    Orders 10-28-20:   Lace up ankle brace weaning out of the cam boot over the next 1-2 weeks into brace  PT to continue her general strengthening, gait training, range of motion     Gait, ROM, gentle strengthening  WBAT   Exercise     LLE Ankle ORIF trimalloelar fracture Reps/ Time Weight/ Level Comments   Nustep L4 x10'               4 way ankle Tband  x20 Green     Toe yoga 10x2 ea      Calf towel S Gentle  30\"x5           MOBO range x20 2/4 and 1/3 setting Standing          // bars:      Mini-squat x20     4 way hip TBand  x15 Green  OKC   lunges 2x10  bilat    Step ups 6\"x20     Step downs 6\"x20     SB Calf S 30\"x3 L3          TGym HR  L16 x20  Lowest setting   TGym squats L16 x20       Specific Instructions for next treatment: Gait train with cane     Treatment Charges: Mins Units   []  Modalities     [x]  Ther Exercise 35 2   []  Manual Therapy     []  Ther Activities     []  Aquatics     []  Vasocompression     []  Gait     Total Treatment time 35 2       Assessment: [x] Progressing toward goals.  Pt with difficulty during step downs resulting in compensation of her hips due to decreased ROM in ankle, pt notes increased pain with step downs. Increased reps this date, pt notes fatigue post. Educated pt on continuing ice at home for 10-15 minutes at a time, pt understands and will continue HEP. [] No change. [] Other:  [x] Patient would continue to benefit from skilled physical therapy services in order to: safely progress to gait training and gentle strength training       NEW STG: (to be met in 6 treatments)  1. Educate patient on proper use of DME and WB progression ONGOING  2. Patient to perform transfers and weight bearing status independent without assistance MET, ongoing with updates to WB status still progressing  3. Independent with Home Exercise Programs ONGOING  4. Demonstrate Knowledge of fall prevention ONGOING    NEW LTG: To be met in 10 visits    1. Increase LE strength to 5/5 WNLs to ease mobility ONGOING   2. Increase flexibility/ROM to WNLs bilat ONGOING, NOT MET   3. Improve score on LEFS from 43/80 to 50/80 or better NEW GOAL     Frequency:  1-2 x/week for 10 visits     Pt. Education:  [x] Yes  [] No  [x] Reviewed Prior HEP/Ed  Method of Education: [x] Verbal  [x] Demo  [] Written  Comprehension of Education:  [x] Verbalizes understanding. [x] Demonstrates understanding. [] Needs review. [] Demonstrates/verbalizes HEP/Ed previously given. Plan: [] Continue current frequency toward long and short term goals.     [x] Specific Instructions for subsequent treatments: advance WB as protocol allows          Time In: 11:00am    Time Out: 11:45am    Electronically signed by:  Kilo Liz PTA

## 2020-12-01 ENCOUNTER — HOSPITAL ENCOUNTER (EMERGENCY)
Facility: CLINIC | Age: 85
Discharge: HOME | End: 2020-12-01
Attending: EMERGENCY MEDICINE
Payer: MEDICARE

## 2020-12-01 ENCOUNTER — HOSPITAL ENCOUNTER (OUTPATIENT)
Dept: PHYSICAL THERAPY | Facility: CLINIC | Age: 85
Setting detail: THERAPIES SERIES
Discharge: HOME | End: 2020-12-01
Payer: MEDICARE

## 2020-12-01 VITALS
DIASTOLIC BLOOD PRESSURE: 72 MMHG | SYSTOLIC BLOOD PRESSURE: 160 MMHG | TEMPERATURE: 97.8 F | HEART RATE: 77 BPM | RESPIRATION RATE: 18 BRPM | OXYGEN SATURATION: 98 %

## 2020-12-01 PROCEDURE — 99283 EMERGENCY DEPT VISIT LOW MDM: CPT

## 2020-12-01 PROCEDURE — 6370000000 HC RX 637 (ALT 250 FOR IP): Performed by: EMERGENCY MEDICINE

## 2020-12-01 RX ORDER — CLONIDINE HYDROCHLORIDE 0.1 MG/1
0.1 TABLET ORAL ONCE
Status: COMPLETED | OUTPATIENT
Start: 2020-12-01 | End: 2020-12-01

## 2020-12-01 RX ADMIN — CLONIDINE HYDROCHLORIDE 0.1 MG: 0.1 TABLET ORAL at 11:45

## 2020-12-01 ASSESSMENT — ENCOUNTER SYMPTOMS
SHORTNESS OF BREATH: 0
SORE THROAT: 0
VOMITING: 0
DIARRHEA: 0

## 2020-12-01 NOTE — ED PROVIDER NOTES
[facility]  [address] [facility]  Phone: [facility]  EMERGENCY DEPARTMENT ENCOUNTER      Pt Name: Harjit Martínez  MRN: [id number]  Julietgfjet 1935  Date of evaluation: 12/1/2020  Provider: Jessica Myrick DO    CHIEF COMPLAINT       Chief Complaint   Patient presents with    Hypertension         HISTORY OF PRESENT ILLNESS   (Location/Symptom, Timing/Onset,Context/Setting, Quality, Duration, Modifying Factors, Severity)  Note limiting factors. Harjit Martínez is a 82 y.o. female who presents to the emergency department for the evaluation of high blood pressure. Patient states that she noticed her blood pressure was elevated yesterday when she went to get evaluated at a neurosurgery clinic for some pain she was having in her neck. They had ordered an MRI for her and they stated for her to monitor her blood pressure. She went to a physical therapy appointment today for a remote left foot fracture and she asked him to check her blood pressure and it was elevated approximately 183 systolic. She states she then went to [facility] where she was going to get a medication for this blood pressure and she checked her pressure again and it was still rising. She has no headache, no chest pain, no shortness of breath, no difficulty urinating. She is been feeling well. She is not been having any of the symptoms recently. She denies any difficulty speaking, denies any numbness, tingling or weakness in her arms or legs. The only blood pressure she medication she takes is hydrochlorothiazide which she has been on for approximately 25 years    Nursing Notes were reviewed. REVIEW OF SYSTEMS    (2-9systems for level 4, 10 or more for level 5)     Review of Systems   Constitutional: Negative for fever. HENT: Negative for sore throat. Respiratory: Negative for shortness of breath. Cardiovascular: Negative for chest pain.    Gastrointestinal: Negative for diarrhea and vomiting. Genitourinary: Negative for dysuria. Skin: Negative for rash. Neurological: Negative for weakness. All other systems reviewed and are negative. Except asnoted above the remainder of the review of systems was reviewed and negative. PAST MEDICAL HISTORY     Past Medical History:   Diagnosis Date    Anemia     Anxiety     Depression     GERD (gastroesophageal reflux disease)     Hypertension     Hypothyroidism     Insomnia     PTSD (post-traumatic stress disorder)     Vitiligo          SURGICAL HISTORY       Past Surgical History:   Procedure Laterality Date    ANKLE FRACTURE SURGERY Left 6/23/2020    LEFT ANKLE  ORIF - OSWALDO performed by Kieran Black MD at [address] [place] [address]      THYROID SURGERY      remove of tumor         CURRENT MEDICATIONS     Previous Medications    ALPRAZOLAM (XANAX) 0.25 MG TABLET    Take 0.25 mg by mouth 2 times daily as needed.     ASPIRIN 325 MG EC TABLET    Take 1 tablet by mouth daily    ASPIRIN 325 MG EC TABLET    Take 1 tablet by mouth daily    BUPROPION (WELLBUTRIN XL) 300 MG XL TABLET    Take 300 mg by mouth every morning     BUSPIRONE (BUSPAR) 10 MG TABLET    Take 10 mg by mouth 2 times daily     DOCUSATE SODIUM (COLACE) 100 MG CAPSULE    Take 1 capsule by mouth 2 times daily as needed for Constipation    FAMOTIDINE (PEPCID) 40 MG TABLET    Take 40 mg by mouth nightly    FLUTICASONE (FLONASE) 50 MCG/ACT NASAL SPRAY    2 sprays by Nasal route daily    HANDICAP PLACARD MISC    by Does not apply route Expires in 3 months    HYDROCHLOROTHIAZIDE (HYDRODIURIL) 25 MG TABLET    take 1 tablet by mouth once daily    LEVOTHYROXINE (SYNTHROID) 25 MCG TABLET    take 1 tablet by mouth once daily    ONDANSETRON (ZOFRAN) 4 MG TABLET    Take 1 tablet by mouth every 12 hours as needed for Nausea or Vomiting    OXYBUTYNIN (DITROPAN XL) 5 MG CR TABLET    Take 1 tablet by mouth daily 97.8 °F (36.6 °C) Oral 77 18 98 %         Physical Exam  Vitals signs and nursing note reviewed. Constitutional:       General: She is not in acute distress. Appearance: Normal appearance. She is not ill-appearing or toxic-appearing. HENT:      Head: Normocephalic and atraumatic. Nose: Nose normal. No congestion. Mouth/Throat:      Mouth: Mucous membranes are moist.   Eyes:      General:         Right eye: No discharge. Left eye: No discharge. Conjunctiva/sclera: Conjunctivae normal.   Neck:      Musculoskeletal: Normal range of motion. Cardiovascular:      Rate and Rhythm: Normal rate and regular rhythm. Pulses: Normal pulses. Heart sounds: Normal heart sounds. No murmur. Pulmonary:      Effort: Pulmonary effort is normal. No respiratory distress. Breath sounds: Normal breath sounds. No wheezing. Abdominal:      General: Abdomen is flat. There is no distension. Palpations: Abdomen is soft. Tenderness: There is no abdominal tenderness. Musculoskeletal: Normal range of motion. General: No deformity or signs of injury. Skin:     General: Skin is warm and dry. Capillary Refill: Capillary refill takes less than 2 seconds. Findings: No rash. Neurological:      General: No focal deficit present. Mental Status: She is alert. Mental status is at baseline. Motor: No weakness. Comments: Speaking normally. No facial asymmetry. Moving all 4 extremities. Normal gait. Psychiatric:         Mood and Affect: Mood normal.         EMERGENCY DEPARTMENT COURSE and DIFFERENTIAL DIAGNOSIS/MDM:   Vitals:    Vitals:    12/01/20 1129 12/01/20 1130   BP:  (!) 206/87   Pulse: 77    Resp: 18    Temp: 97.8 °F (36.6 °C)    TempSrc: Oral    SpO2: 98%        Patient presents to the emergency department with the complaint described above.   Vital signs are grossly normal with the exception of hypertension and she is nontoxic in appearance and resting comfortably. Physical examination reveals no abnormalities. I am going to give her a dose of clonidine though she does have asymptomatic hypertension. Review the medical records show she had blood work done less than 6 months ago with normal renal function. I will contact her primary care physician and I will reevaluate      DIAGNOSTIC RESULTS     LABS:  Labs Reviewed - No data to display    All other labs were within normal range or not returned as of this dictation. RADIOLOGY:  No orders to display         ED Course as of Dec 01 1205   Tue Dec 01, 2020   1203 I did speak with the patient's primary care physician's office, they had spoke with her primary care doctor, Dr. Joaquin Daily. Dr. Rosemary Escudero had wanted to start the patient on metoprolol and [facility] on [address]. I will advised the patient to go ahead and fill this prescription and start this medication. I have also advised her to keep a blood pressure log. I talked about asymptomatic hypertension as well as symptomatic hypertension including what to return to the ER for    At this time the patient is without objective evidence of an acute process requiring hospitalization or inpatient management. They have remained hemodynamically stable and are stable for discharge with outpatient follow-up. Standard anticipatory guidance given to patient upon discharge. Have given them a specific time frame in which to follow-up and who to follow-up with. I have also advised them that they should return to the emergency department if they get worse, or not getting better or develop any new or concerning symptoms. Patient demonstrates understanding.    [TS]      ED Course User Index  [TS] [facility], DO         PROCEDURES:  Unless otherwise noted below, none     Procedures    FINAL IMPRESSION      1.  Asymptomatic hypertension          DISPOSITION/PLAN   DISPOSITION Decision To Discharge 12/01/2020 12:04:32 PM      PATIENT REFERRED TO:  Citlali Miller DO  [address] [facility] [address] [place] [address]  [phone]    In 1 week        DISCHARGE MEDICATIONS:  New Prescriptions    No medications on file          (Please note that portions of this note were completed with a voice recognition program.  Efforts were made to edit the dictations but occasionally words are mis-transcribed.)    Miranda Hein DO (electronically signed)  Board Certified Emergency Physician         Miranda Hein DO  12/01/20 5188

## 2020-12-01 NOTE — FLOWSHEET NOTE
[] [facility]) - [facility] &  Therapy  [address]    [phone]  F: [phone]   [] [address]  [employer] [address]   [address]  P: [phone]  F: [phone]  [] [place]  [address]  P: [phone]  F: [phone] [] [address]  P: [phone]  F: [phone]  [] [address]  [facility]   [address]   [place]: [phone]  F: [phone]   [] [place] [address] [facility] [address]  [place]: [phone]   F: [phone]     Physical Therapy Cancel/No Show note    Date: 2020  Patient: Mayte Oh  : 1935  MRN: [id number]    Cancels/No Shows to date:     For today's appointment patient:    [x]  Cancelled    [] Rescheduled appointment    [] No-show     Reason given by patient:    []  Patient ill    []  Conflicting appointment    [] No transportation      [] Conflict with work    [] No reason given    [] Weather related    [] COVID-19    [x] Other:      Comments: Pt arrives stating she had an appointment for her neck yesterday and her blood pressure systolic was [id number] and her doctor told her to get her blood pressure taken before therapy today. Pt's blood pressure is 191/72mmHg, therefore, will hold PT today. Advised pt to contact physician about blood pressure medication and seek medical attention.           [x] Next appointment was confirmed    Electronically signed by: Donna Johnston PTA

## 2020-12-03 ENCOUNTER — HOSPITAL ENCOUNTER (OUTPATIENT)
Dept: PHYSICAL THERAPY | Facility: CLINIC | Age: 85
Setting detail: THERAPIES SERIES
Discharge: HOME | End: 2020-12-03
Payer: MEDICARE

## 2020-12-03 NOTE — FLOWSHEET NOTE
[] [facility]) - [facility] &  Therapy  [address]    [phone]  F: [phone]   [] [address]  [facility]   [address]  P: [phone]  F: [phone]  [] [address] [place] [address]  [address]  P: [phone]  F: [phone] [] [address]  P: [phone]  F: [phone]  [] [address]  [facility]   [address]   [place]: [phone]  F: [phone]   [] [place] [address] [facility] [address]  [place]: [phone]   F: [phone]     Physical Therapy Cancel/No Show note    Date: 12/3/2020  Patient: Stephen Gonsalez  : 1935  MRN: [id number]    Cancels/No Shows to date:     For today's appointment patient:    [x]  Cancelled    [] Rescheduled appointment    [] No-show     Reason given by patient:    []  Patient ill    []  Conflicting appointment    [] No transportation      [] Conflict with work    [] No reason given    [] Weather related    [] COVID-19    [x] Other:      Comments: Pt called to cx PT appt because she is still having problems with her blood pressure. Pt said the new medication she's on to treat it have her hyper during the day & up at night with horrible hallucinations & nightmares. Pt said she has been to the ER for her blood pressure & she has a condition that is asymptomatic right now.       [x] Next appointment was confirmed    Electronically signed by: Elaina Sanders

## 2020-12-08 ENCOUNTER — HOSPITAL ENCOUNTER (OUTPATIENT)
Dept: PHYSICAL THERAPY | Facility: CLINIC | Age: 85
Setting detail: THERAPIES SERIES
Discharge: HOME | End: 2020-12-08
Payer: MEDICARE

## 2020-12-08 ENCOUNTER — HOSPITAL ENCOUNTER (OUTPATIENT)
Dept: MRI IMAGING | Facility: CLINIC | Age: 85
Discharge: HOME | End: 2020-12-10
Payer: MEDICARE

## 2020-12-08 PROCEDURE — 97140 MANUAL THERAPY 1/> REGIONS: CPT

## 2020-12-08 PROCEDURE — 72141 MRI NECK SPINE W/O DYE: CPT

## 2020-12-08 PROCEDURE — 72146 MRI CHEST SPINE W/O DYE: CPT

## 2020-12-08 PROCEDURE — 97110 THERAPEUTIC EXERCISES: CPT

## 2020-12-08 NOTE — FLOWSHEET NOTE
Gait 5 0   Total Treatment time 50 3       Assessment: [x] Progressing toward goals. Progressed program, pt with no issues throughout. Pt with a blood pressure reading of 159/70 mmHg after 35 minutes of exercises. Educated and demonstrated proper gait pattern with single point cane, pt with good balance and demonstrates good understanding and will begin ambulating with cane at home. Applied manual to gastroc, pt with significant tightness in lateral gastroc, decreased muscle tension post manual pt notes feeling good afterward. [] No change. [] Other:  [x] Patient would continue to benefit from skilled physical therapy services in order to: safely progress to gait training and gentle strength training       NEW STG: (to be met in 6 treatments)  1. Educate patient on proper use of DME and WB progression ONGOING  2. Patient to perform transfers and weight bearing status independent without assistance MET, ongoing with updates to WB status still progressing  3. Independent with Home Exercise Programs ONGOING  4. Demonstrate Knowledge of fall prevention ONGOING    NEW LTG: To be met in 10 visits    1. Increase LE strength to 5/5 WNLs to ease mobility ONGOING   2. Increase flexibility/ROM to WNLs bilat ONGOING, NOT MET   3. Improve score on LEFS from 43/80 to 50/80 or better NEW GOAL     Frequency:  1-2 x/week for 10 visits     Pt. Education:  [x] Yes  [] No  [x] Reviewed Prior HEP/Ed  Method of Education: [x] Verbal  [x] Demo  [] Written  Comprehension of Education:  [x] Verbalizes understanding. [x] Demonstrates understanding. [] Needs review. [] Demonstrates/verbalizes HEP/Ed previously given. Plan: [] Continue current frequency toward long and short term goals.     [x] Specific Instructions for subsequent treatments: advance WB as protocol allows          Time In: 10:00am    Time Out: 11:00am    Electronically signed by:  Kilo Liz PTA

## 2020-12-10 ENCOUNTER — HOSPITAL ENCOUNTER (OUTPATIENT)
Dept: PHYSICAL THERAPY | Facility: CLINIC | Age: 85
Setting detail: THERAPIES SERIES
Discharge: HOME | End: 2020-12-10
Payer: MEDICARE

## 2020-12-10 PROCEDURE — 97110 THERAPEUTIC EXERCISES: CPT

## 2020-12-10 PROCEDURE — 97140 MANUAL THERAPY 1/> REGIONS: CPT

## 2020-12-10 NOTE — CARE COORDINATION
Medicare Cap   [x] Physical Therapy  [] Speech Therapy  [] Occupational therapy  *PT and Speech caps combine      $2010 Cap limit < kx modifier needed < $0975 requires pre-cert        Patient Name: Mark Broderick  YOB: 1935    Note:  This is an estimate of charges billed.                 Date of [address] Name # units/ charge $$$ charge Daily Total Charge Ongoing Total $$$   8--7-20 eval  gait 1  1 82.82  23.06 105.88 105.88    9-8-20 gait 2 29.40  23.06 52.46 158.34   9-22-20 gait  2  29.40  23.06  52.46  210.80    10-6-20 Gait  therex  1  2 29.40  29.72  23.22 82.34 293.14    10-13-20  there ex  2  25.26  19.74  45  338.14   10-15-20  there ex  2  29.72+23.22  52.94  391.08   10-20-20  there ex  3  25.26+19.74+19.74  64.74  455.82   10-27-20 therex  3   25.26+19.74+19.74  64.74 520.56    10-29-20 therex  3     64.74  585.30    11/3/2020  therex  3  25.26+19.74+19.74  64.74  650.04    11-5-20  therex    3   25.26+19.74+19.74  64.74  714.78    11-10-20 therex   3    25.26+19.74+19.74  64.74  779.52    11-12-20 therex    3 25.26+19.74+19.74  64.74   844.26    11/17/20 therex PTA  2  25.26+19.74 45.00  889.26    11-19-20 Therex  PT   3  29.72+23.22+23.22 76.16  965.42    11/24/20 therex PTA  2 25.26+19.74  45.00  1010.42    12/8/20  2ex+man 3  25.26+18.45  43.71  1054.13    12/10/20  2ex+man PTA  3    43.71  1097.84

## 2020-12-10 NOTE — FLOWSHEET NOTE
[x] [facility]  Outpatient Rehabilitation &  Therapy  [facility]   [place]: [phone]  F: [phone]      Physical Therapy Daily Treatment Note     Date:  12/10/2020  Patient Name:  Yessica Keller    :  1935  MRN: [id number]  Physician: Dr Sherita Valdez: Medicare   Medical Diagnosis: LLE Ankle ORIF trimalloelar fracture               Rehab Codes: L73.706E  Onset date:    20                        Next Dr's appt.: 20  Visit# / total visits: ; Progress note for Medicare patient due at visit 20     Cancels/No Shows: 0/0    Subjective:    Pain:  [x] Yes  [] No Location: LLE ankle  Pain Rating: (0-10 scale) 2/10  Pain altered Tx:  [x] No  [] Yes  Action:  Comments: Pt arrives using rolling walker and has single point cane in hand stating she wanted to make sure she was using the cane correctly and it was sized to her properly before using. Pt states intermittent pain in lateral ankle throughout the day. Pt states the hypervolt really helped last time. Pt reports she is going to call today to see if her MRI results came back from her neck. Objective:    Precautions: Hypertension     Gait, ROM, gentle strengthening  WBAT   Exercise     LLE Ankle ORIF trimalloelar fracture Reps/ Time Weight/ Level Comments   Nustep L4 x10'               // bars:      SLS 3x30\"     4 way hip TBand  x15 Orange  LLE CKC   Balance Board 3' L2    Lunges 2x10  bilat    Step ups 6\"x20     Step downs 6\"x20     SB Calf S 30\"x3 L3          TGym HR  L20x20 L3    TGym squats L20x20       Specific Instructions for next treatment:     Treatment Charges: Mins Units   []  Modalities     [x]  Ther Exercise 35 2   [x]  Manual Therapy 10 1   []  Ther Activities     []  Aquatics     []  Vasocompression     []  Gait     Total Treatment time 50 3       Assessment: [x] Progressing toward goals.  Continued with program, pt completed all exercises outside of parallel bars with unilateral UE support via stool handle. Educated pt to add single leg stance and continue stretching calf muscle at home, pt understands. Applied manual via hypervolt with increased tightness in medial gastroc head. Pt had a blood pressure reading of 171/62 mmHg prior to exercises and 143/63mmHg post exercises. Pt plans on calling to get MRI results this date and will attend therapy next week for a re-evaluation with primary therapist.          [] No change. [] Other:  [x] Patient would continue to benefit from skilled physical therapy services in order to: safely progress to gait training and gentle strength training       NEW STG: (to be met in 6 treatments)  1. Educate patient on proper use of DME and WB progression ONGOING  2. Patient to perform transfers and weight bearing status independent without assistance MET, ongoing with updates to WB status still progressing  3. Independent with Home Exercise Programs ONGOING  4. Demonstrate Knowledge of fall prevention ONGOING    NEW LTG: To be met in 10 visits    1. Increase LE strength to 5/5 WNLs to ease mobility ONGOING   2. Increase flexibility/ROM to WNLs bilat ONGOING, NOT MET   3. Improve score on LEFS from 43/80 to 50/80 or better NEW GOAL     Frequency:  1-2 x/week for 10 visits     Pt. Education:  [x] Yes  [] No  [x] Reviewed Prior HEP/Ed  Method of Education: [x] Verbal  [x] Demo  [] Written  Comprehension of Education:  [x] Verbalizes understanding. [x] Demonstrates understanding. [] Needs review. [] Demonstrates/verbalizes HEP/Ed previously given. Plan: [] Continue current frequency toward long and short term goals.     [x] Specific Instructions for subsequent treatments: advance WB as protocol allows          Time In: 10:00am    Time Out: 11:00am    Electronically signed by:  Vickey Sanchez PTA

## 2020-12-14 ENCOUNTER — HOSPITAL ENCOUNTER (OUTPATIENT)
Dept: PHYSICAL THERAPY | Facility: CLINIC | Age: 85
Setting detail: THERAPIES SERIES
Discharge: HOME | End: 2020-12-14
Payer: MEDICARE

## 2020-12-14 PROCEDURE — 97140 MANUAL THERAPY 1/> REGIONS: CPT

## 2020-12-14 PROCEDURE — 97110 THERAPEUTIC EXERCISES: CPT

## 2020-12-14 NOTE — CARE COORDINATION
Medicare Cap   [x] Physical Therapy  [] Speech Therapy  [] Occupational therapy  *PT and Speech caps combine      $2010 Cap limit < kx modifier needed < $9729 requires pre-cert        Patient Name: Isabel Arriaga  YOB: 1935    Note:  This is an estimate of charges billed.                 Date of [address] Name # units/ charge $$$ charge Daily Total Charge Ongoing Total $$$   8--7-20 eval  gait 1  1 82.82  23.06 105.88 105.88    9-8-20 gait 2 29.40  23.06 52.46 158.34   9-22-20 gait  2  29.40  23.06  52.46  210.80    10-6-20 Gait  therex  1  2 29.40  29.72  23.22 82.34 293.14    10-13-20  there ex  2  25.26  19.74  45  338.14   10-15-20  there ex  2  29.72+23.22  52.94  391.08   10-20-20  there ex  3  25.26+19.74+19.74  64.74  455.82   10-27-20 therex  3   25.26+19.74+19.74  64.74 520.56    10-29-20 therex  3     64.74  585.30    11/3/2020  therex  3  25.26+19.74+19.74  64.74  650.04    11-5-20  therex    3   25.26+19.74+19.74  64.74  714.78    11-10-20 therex   3    25.26+19.74+19.74  64.74  779.52    11-12-20 therex    3 25.26+19.74+19.74  64.74   844.26    11/17/20 therex PTA  2  25.26+19.74 45.00  889.26    11-19-20 Therex  PT   3  29.72+23.22+23.22 76.16  965.42    11/24/20 therex PTA  2 25.26+19.74  45.00  1010.42    12/8/20  2ex+man 3  25.26+18.45  43.71  1054.13    12/10/20  2ex+man PTA  3    43.71  1097.84    12/14/20 2 Therex  1 manual     PT  2  1  29.72+23.22+18.45  71.39  1418.73

## 2020-12-14 NOTE — FLOWSHEET NOTE
Assessment: [x] Progressing toward goals. Pt with no lightheaded or dizzy symptoms today, able top progress through program without difficulty. Patient educated on proper gait with st cane progression, able to perform independently. Discussed plan to discharge to home program after 2 more visits. [] No change. [] Other:  [x] Patient would continue to benefit from skilled physical therapy services in order to: safely progress to gait training and gentle strength training       NEW STG: (to be met in 6 treatments)  1. Educate patient on proper use of DME and WB progression ONGOING  2. Patient to perform transfers and weight bearing status independent without assistance MET, ongoing with updates to WB status still progressing  3. Independent with Home Exercise Programs ONGOING  4. Demonstrate Knowledge of fall prevention ONGOING    NEW LTG: To be met in 10 visits    1. Increase LE strength to 5/5 WNLs to ease mobility ONGOING   2. Increase flexibility/ROM to WNLs bilat ONGOING, NOT MET   3. Improve score on LEFS from 43/80 to 50/80 or better NEW GOAL     Frequency:  1-2 x/week for 10 visits     Pt. Education:  [x] Yes  [] No  [x] Reviewed Prior HEP/Ed  Method of Education: [x] Verbal  [x] Demo  [] Written  Comprehension of Education:  [x] Verbalizes understanding. [x] Demonstrates understanding. [] Needs review. [] Demonstrates/verbalizes HEP/Ed previously given. Plan: [] Continue current frequency toward long and short term goals.     [x] Specific Instructions for subsequent treatments: advance WB as protocol allows          Time In: 1300   Time Out: 6129    Electronically signed by:  Primitivo Fountain, PT

## 2020-12-16 ENCOUNTER — APPOINTMENT (OUTPATIENT)
Dept: PHYSICAL THERAPY | Facility: CLINIC | Age: 85
End: 2020-12-16
Payer: MEDICARE

## 2020-12-17 ENCOUNTER — HOSPITAL ENCOUNTER (OUTPATIENT)
Dept: PHYSICAL THERAPY | Facility: CLINIC | Age: 85
Setting detail: THERAPIES SERIES
Discharge: HOME | End: 2020-12-17
Payer: MEDICARE

## 2020-12-17 NOTE — FLOWSHEET NOTE
[] [facility]) - [facility] &  Therapy  [address]    [phone]  F: [phone]   [] [address]  [facility] [place] [facility]\"" [address]   [address]  P: [phone]  F: [phone]  [] [address] &  Therapy  [address]  P: [phone]  F: [phone] [] [address]  P: [phone]  F: [phone]  [] [address]  [facility]   [address]   [place]: [phone]  F: [phone]   [] [facility]  [address]  [place]: [phone]   F: [phone]     Physical Therapy Cancel/No Show note    Date: 2020  Patient: Elkin Garcia  : 1935  MRN: [id number]    Cancels/No Shows to date: 3/0    For today's appointment patient:    [x]  Cancelled    [] Rescheduled appointment    [] No-show     Reason given by patient:    []  Patient ill    []  Conflicting appointment    [x] No transportation      [] Conflict with work    [] No reason given    [] Weather related    [] COVID-19    [x] Other:      Comments: Pt said her garage door won't open so she can't get her car out.      [x] Next appointment was confirmed    Electronically signed by: Petra Fields

## 2020-12-21 ENCOUNTER — HOSPITAL ENCOUNTER (OUTPATIENT)
Dept: PHYSICAL THERAPY | Facility: CLINIC | Age: 85
Setting detail: THERAPIES SERIES
Discharge: HOME | End: 2020-12-21
Payer: MEDICARE

## 2020-12-21 PROCEDURE — 97110 THERAPEUTIC EXERCISES: CPT

## 2020-12-21 NOTE — FLOWSHEET NOTE
[x] [facility]  Outpatient Rehabilitation &  Therapy  [facility]   [place]: [phone]  F: [phone]      Physical Therapy Daily Treatment Note and Recheck/Discharge     Date:  2020  Patient Name:  Isabel Arriaga    :  1935  MRN: [id number]  Physician: Dr Noris Wood: Medicare   Medical Diagnosis: LLE Ankle ORIF trimalloelar fracture               Rehab Codes: Q00.060V  Onset date:    20                        Next 's appt.: 20  Visit# / total visits: ; Progress note for Medicare patient due at visit 20     Cancels/No Shows: 0/0    Subjective:    Pain:  [x] Yes  [] No Location: LLE ankle  Pain Rating: (0-10 scale) 1/10  Pain altered Tx:  [x] No  [] Yes  Action:  Comments: Pt reports no pain today, she has been walking around at grocery store with her cane and the cart without difficulty. Objective:    Precautions: Hypertension     Gait, ROM, gentle strengthening  WBAT   Exercise     LLE Ankle ORIF trimalloelar fracture Reps/ Time Weight/ Level Comments         Nustep L4 x10'               // bars:      SLS 3x30\"     4 way hip TBand  x20 Orange  LLE CKC   Balance Board 3' L2    Lunges 2x10  bilat    Step ups 6\"x20     Step downs 6\"x20     SB Calf S 30\"x3 L3    Heel raises  x20     Squat to chair 2x10     HS belt S 30\"x3     TGym HR  L20x20 L3    TGym squats L20x20         Treatment Charges: Mins Units   []  Modalities     [x]  Ther Exercise 42 3   [x]  Manual Therapy     []  Ther Activities     []  Aquatics     []  Vasocompression     []  Gait     Total Treatment time 42 3       Assessment: [x] Progressing toward goals. Pt has shown improvements in her mobility and HEP adherence. She was given an updated list of her HEP     [] No change.      [] Other:  [x] Patient would continue to benefit from skilled physical therapy services in order to: safely progress to gait training and gentle strength training

## 2020-12-23 ENCOUNTER — OFFICE VISIT (OUTPATIENT)
Dept: ORTHOPEDIC SURGERY | Age: 85
End: 2020-12-23
Payer: MEDICARE

## 2020-12-23 VITALS
HEART RATE: 60 BPM | BODY MASS INDEX: 22.66 KG/M2 | TEMPERATURE: 97 F | RESPIRATION RATE: 14 BRPM | HEIGHT: 65 IN | WEIGHT: 136 LBS

## 2020-12-23 DIAGNOSIS — S82.852D CLOSED TRIMALLEOLAR FRACTURE OF LEFT ANKLE WITH ROUTINE HEALING, SUBSEQUENT ENCOUNTER: Primary | ICD-10-CM

## 2020-12-23 PROCEDURE — 1090F PRES/ABSN URINE INCON ASSESS: CPT | Performed by: ORTHOPAEDIC SURGERY

## 2020-12-23 PROCEDURE — G8400 PT W/DXA NO RESULTS DOC: HCPCS | Performed by: ORTHOPAEDIC SURGERY

## 2020-12-23 PROCEDURE — G8420 CALC BMI NORM PARAMETERS: HCPCS | Performed by: ORTHOPAEDIC SURGERY

## 2020-12-23 PROCEDURE — 99213 OFFICE O/P EST LOW 20 MIN: CPT | Performed by: ORTHOPAEDIC SURGERY

## 2020-12-23 PROCEDURE — G8428 CUR MEDS NOT DOCUMENT: HCPCS | Performed by: ORTHOPAEDIC SURGERY

## 2020-12-23 PROCEDURE — 1036F TOBACCO NON-USER: CPT | Performed by: ORTHOPAEDIC SURGERY

## 2020-12-23 PROCEDURE — 1123F ACP DISCUSS/DSCN MKR DOCD: CPT | Performed by: ORTHOPAEDIC SURGERY

## 2020-12-23 PROCEDURE — G8484 FLU IMMUNIZE NO ADMIN: HCPCS | Performed by: ORTHOPAEDIC SURGERY

## 2020-12-23 PROCEDURE — 4040F PNEUMOC VAC/ADMIN/RCVD: CPT | Performed by: ORTHOPAEDIC SURGERY

## 2020-12-23 NOTE — PROGRESS NOTES
[facility] AND SPORTS MEDICINE  [facility]  [address] [place], [place] [id number]  Dept: [phone]    Ambulatory Orthopedic Follow Up Visit    Preoperative Diagnosis:   1. Left closed ankle fracture (trimalleolar ankle fracture-dislocation)  2. Left distal tibia varus malunion  3. Hypothyroidism     Postoperative Diagnosis:   1. Same as above     Procedures Performed:  (6/23/2020)  1. Left ankle open reduction internal fixation of trimalleolar ankle fracture (medial, lateral, and posterior malleoli)    CHIEF COMPLAINT:    Chief Complaint   Patient presents with    Ankle Pain     Left Ankle         HISTORY OF PRESENT ILLNESS:       She is a 82 y.o. female, seen again today in the office for follow up of the above problem with a history of pain at the above location left ankle. The pain is described mainly with mechanical terms (dull/sharp/throbbing). The pain is worse with activity and better with rest. Since being seen last, the patient reports she had a fall on 10/22/2020 at home, and reports injuring her head as well as her left ankle. She was seen in the ER and a CT scan of her head was negative, but does report an increase in left ankle pain, localized to her lateral hindfoot. She also reports an associated increase in swelling. INTERVAL HISTORY 12/23/2020:  She is seen again today in the office for follow up of a previous issue (as above). Since being seen last, the patient is doing better. She is ambulating today using a cane with a compression sock without a brace. The location and quality of the pain have not significantly changed since the last visit. REVIEW OF SYSTEMS:   Constitutional: Negative for fever. HENT: Negative for tinnitus. Eyes: Negative for pain. Respiratory: Negative for shortness of breath. Cardiovascular: Negative for chest pain. Gastrointestinal: Negative for abdominal pain. Genitourinary: Negative for dysuria.    Skin: (WELLBUTRIN XL) 300 MG XL tablet, Take 300 mg by mouth every morning , Disp: , Rfl:     traZODone (DESYREL) 100 MG tablet, Take 100 mg by mouth nightly , Disp: , Rfl:     fluticasone (FLONASE) 50 MCG/ACT nasal spray, 2 sprays by Nasal route daily, Disp: 1 Bottle, Rfl: 3     Allergies:    Ampicillin, Erythromycin, Doxycycline, Paroxetine, Paroxetine hcl, Penicillins, and Sulfa antibiotics    Family History:  family history includes High Blood Pressure in her mother; Stroke in her mother. Social History:   Social History     Occupational History    Not on file   Tobacco Use    Smoking status: Never Smoker    Smokeless tobacco: Never Used   Substance and Sexual Activity    Alcohol use: Yes     Comment: occas    Drug use: No    Sexual activity: Not Currently       OBJECTIVE:  Pulse 60   Temp 97 °F (36.1 °C)   Resp 14   Ht 5' 5\" (1.651 m)   Wt 136 lb (61.7 kg)   BMI 22.63 kg/m²    Psych: alert and oriented to person, time, and place  Cardio:  well perfused extremities  Resp:  normal respiratory effort  Skin:  no cyanosis  Hem/lymph:  no lymphedema  Neuro:  sensation to light touch unchanged since last visit  Musculoskeletal:    MUSCULOSKELETAL (affected lower extremity):  Vascular: Toes warm and well perfused, compartments soft/compressible, no significant swelling of ankle/foot. Skin:  Intact over foot/ankle, without rash/lesions/AV malformations. Motion: Able to wiggle toes   -Range of motion of foot/ankle grossly normal  -No tenderness at knee or proximal leg   -Tenderness to palpation: Mild medial and laterally over the hardware  -Good painless ankle range of motion      RADIOLOGY:   12/23/2020 FINDINGS:  Three weightbearing views (AP, Mortise, and Lateral) of the left ankle were obtained in the office today and reviewed, revealing intact hardware status post ORIF of trimalleolar ankle fracture.   Interval healing noted without interval displacement.     IMPRESSION:  Ankle fracture as above s/p ORIF.      Electronically signed by Cristiane Pisano MD       ASSESSMENT AND PLAN:  Kyler Guy was seen today for Ankle Pain (Left Ankle)  The encounter diagnosis was Closed trimalleolar fracture of left ankle with routine healing, subsequent encounter. Body mass index is 22.63 kg/m². She has a history of a left trimalleolar ankle fracture status post ORIF in June 2020, doing very well overall. Notably, she has a history of anxiety/depression/PTSD, anemia, GERD, hypertension, hypothyroidism, and insomnia    I had another discussion today with the patient about the likely diagnosis and its natural history, physical exam and imaging findings, as well as various treatment options in detail. Orders/referrals were placed as below at today's visit. She does not need any further immobilization, and may continue to be weightbearing as tolerated. We discussed using a cane/walker as needed to help with balance. I renewed her sessions with physical therapy, as she is continuing to see benefit, and reports that it is still helping her. She may use compression socks as needed. All questions were answered and the patient agrees with the above plan. The patient will return to clinic in the future as needed.            At the patient's next visit, depending on how the patient is doing and/or new imaging/labs results, we may consider the following options:    []  Orthotic (OTC)     []  Orthotic (custom)          []  Rocker bottom shoes     []  Brace (OTC)        []  Brace (custom)             []  CAM boot        []  Night splint         []  Heel cups        []  Strap      []  Toe sleeves/splints    []  PT:                     [x]  Wean out of immobilization   [x]  Advance activity       []  Topical               []  NSAIDs          []  Vanda         []  Referral:         []  Stress xrays       []  CT         []  MRI        []  Injection:         []  Consider OR      []  Pick OR date    Return if symptoms worsen or fail to improve. No orders of the defined types were placed in this encounter. No orders of the defined types were placed in this encounter. Radha Calderón MD  Orthopedic Surgery        Please excuse any typos/errors, as this note was created with the assistance of voice recognition software. While intending to generate a document that actually reflects the content of the visit, the document can still have some errors including those of syntax and sound-a-like substitutions which may escape proof reading. In such instances, actual meaning can be extrapolated by context.

## 2021-02-05 ENCOUNTER — NURSE TRIAGE (OUTPATIENT)
Dept: OTHER | Facility: CLINIC | Age: 86
End: 2021-02-05

## 2021-02-05 NOTE — TELEPHONE ENCOUNTER
Reason for Disposition   Followed an injury   Injury is still painful or swollen after 2 weeks    Answer Assessment - Initial Assessment Questions  1. ONSET: \"When did the pain start? \"   2 weeks ago    2. LOCATION: \"Where is the pain located? \"   Lt ankle-broke it last summer and had surgery. Has ortho appointment next week. 3. PAIN: \"How bad is the pain? \"    (Scale 1-10; or mild, moderate, severe)   - MILD (1-3): doesn't interfere with normal activities    - MODERATE (4-7): interferes with normal activities (e.g., work or school) or awakens from sleep, limping    - SEVERE (8-10): excruciating pain, unable to do any normal activities, unable to walk     7/10    4. WORK OR EXERCISE: \"Has there been any recent work or exercise that involved this part of the body? \"   No    5. CAUSE: \"What do you think is causing the ankle pain? \"     Hit it on base of recliner 2 weeks ago    6. OTHER SYMPTOMS: \"Do you have any other symptoms? \" (e.g., calf pain, rash, fever, swelling)    Mild swelling and stiff    7. PREGNANCY: \"Is there any chance you are pregnant? \" \"When was your last menstrual period? \"   no    Answer Assessment - Initial Assessment Questions  1. MECHANISM: \"How did the injury happen? \" (e.g., twisting injury, direct blow)       Hit it on recliner frame    2. ONSET: \"When did the injury happen? \" (Minutes or hours ago)   2 weeks ago    3. LOCATION: \"Where is the injury located? \"   Left ankle    4. APPEARANCE of INJURY: \"What does the injury look like? \"   Healed cut    5. WEIGHT-BEARING: \"Can you put weight on that foot? \" \"Can you walk (four steps or more)? \"    Can walk    6. SIZE: For cuts, bruises, or swelling, ask: \"How large is it? \" (e.g., inches or centimeters;  entire joint)    n/a    7. PAIN: \"Is there pain? \" If so, ask: \"How bad is the pain? \"    (e.g., Scale 1-10; or mild, moderate, severe)      7/10    8. TETANUS: For any breaks in the skin, ask: \"When was the last tetanus booster? \"      N/a    9. OTHER SYMPTOMS: \"Do you have any other symptoms? \"       Mild swelling and stiffness    10. PREGNANCY: \"Is there any chance you are pregnant? \" \"When was your last menstrual period? \"  no    Protocols used: ANKLE PAIN-ADULT-[place], ANKLE AND FOOT INJURY-ADULT-[place]    Patient called Milton Knapp at [facility])  with red flag complaint. Brief description of triage: ankle injury/pain    Triage indicates for patient to be seen within 3 days. Has ortho appointment on 2/12/2021. Advised she can go to [facility]. Writer contacted Dr. Lesvia Toth office and spoke Bebo Perry, germáner has been added to cancellation list.    Care advice provided, patient verbalizes understanding; denies any other questions or concerns; instructed to call back for any new or worsening symptoms. Attention Provider: Thank you for allowing me to participate in the care of your patient. The patient was connected to triage in response to information provided to the [facility]. Please do not respond through this encounter as the response is not directed to a shared pool.

## 2021-02-12 ENCOUNTER — OFFICE VISIT (OUTPATIENT)
Dept: ORTHOPEDIC SURGERY | Age: 86
End: 2021-02-12
Payer: MEDICARE

## 2021-02-12 VITALS — RESPIRATION RATE: 12 BRPM | BODY MASS INDEX: 22.66 KG/M2 | HEIGHT: 65 IN | WEIGHT: 136 LBS | TEMPERATURE: 96.7 F

## 2021-02-12 DIAGNOSIS — T14.8XXA CONTUSION OF BONE: ICD-10-CM

## 2021-02-12 DIAGNOSIS — S82.852D CLOSED TRIMALLEOLAR FRACTURE OF LEFT ANKLE WITH ROUTINE HEALING, SUBSEQUENT ENCOUNTER: Primary | ICD-10-CM

## 2021-02-12 DIAGNOSIS — M19.172 POST-TRAUMATIC ARTHRITIS OF LEFT ANKLE: ICD-10-CM

## 2021-02-12 PROCEDURE — 1123F ACP DISCUSS/DSCN MKR DOCD: CPT | Performed by: ORTHOPAEDIC SURGERY

## 2021-02-12 PROCEDURE — G8427 DOCREV CUR MEDS BY ELIG CLIN: HCPCS | Performed by: ORTHOPAEDIC SURGERY

## 2021-02-12 PROCEDURE — 4040F PNEUMOC VAC/ADMIN/RCVD: CPT | Performed by: ORTHOPAEDIC SURGERY

## 2021-02-12 PROCEDURE — 20605 DRAIN/INJ JOINT/BURSA W/O US: CPT | Performed by: ORTHOPAEDIC SURGERY

## 2021-02-12 PROCEDURE — 99213 OFFICE O/P EST LOW 20 MIN: CPT | Performed by: ORTHOPAEDIC SURGERY

## 2021-02-12 PROCEDURE — G8400 PT W/DXA NO RESULTS DOC: HCPCS | Performed by: ORTHOPAEDIC SURGERY

## 2021-02-12 PROCEDURE — G8420 CALC BMI NORM PARAMETERS: HCPCS | Performed by: ORTHOPAEDIC SURGERY

## 2021-02-12 PROCEDURE — G8484 FLU IMMUNIZE NO ADMIN: HCPCS | Performed by: ORTHOPAEDIC SURGERY

## 2021-02-12 PROCEDURE — 1090F PRES/ABSN URINE INCON ASSESS: CPT | Performed by: ORTHOPAEDIC SURGERY

## 2021-02-12 PROCEDURE — 1036F TOBACCO NON-USER: CPT | Performed by: ORTHOPAEDIC SURGERY

## 2021-02-12 RX ORDER — LIDOCAINE HYDROCHLORIDE 10 MG/ML
2 INJECTION, SOLUTION INFILTRATION; PERINEURAL ONCE
Status: COMPLETED | OUTPATIENT
Start: 2021-02-12 | End: 2021-02-12

## 2021-02-12 RX ORDER — TRIAMCINOLONE ACETONIDE 40 MG/ML
40 INJECTION, SUSPENSION INTRA-ARTICULAR; INTRAMUSCULAR ONCE
Status: COMPLETED | OUTPATIENT
Start: 2021-02-12 | End: 2021-02-12

## 2021-02-12 RX ADMIN — LIDOCAINE HYDROCHLORIDE 2 ML: 10 INJECTION, SOLUTION INFILTRATION; PERINEURAL at 11:36

## 2021-02-12 RX ADMIN — TRIAMCINOLONE ACETONIDE 40 MG: 40 INJECTION, SUSPENSION INTRA-ARTICULAR; INTRAMUSCULAR at 11:37

## 2021-02-12 NOTE — PROGRESS NOTES
[facility]  [facility] Yovani Monet  [address] [id number]  Dept: [phone]    Ambulatory Orthopedic Follow Up Visit    Preoperative Diagnosis:   1. Left closed ankle fracture (trimalleolar ankle fracture-dislocation)  2. Left distal tibia varus malunion  3. Hypothyroidism     Postoperative Diagnosis:   1. Same as above     Procedures Performed:  (6/23/2020)  1. Left ankle open reduction internal fixation of trimalleolar ankle fracture (medial, lateral, and posterior malleoli)    CHIEF COMPLAINT:    Chief Complaint   Patient presents with    Ankle Pain     left ankle         HISTORY OF PRESENT ILLNESS:       She is a 82 y.o. female, seen again today in the office for follow up of the above problem with a history of pain at the above location left ankle. The pain is described mainly with mechanical terms (dull/sharp/throbbing). The pain is worse with activity and better with rest. Since being seen last, the patient reports she had a fall on 10/22/2020 at home, and reports injuring her head as well as her left ankle. She was seen in the ER and a CT scan of her head was negative, but does report an increase in left ankle pain, localized to her lateral hindfoot. She also reports an associated increase in swelling. INTERVAL HISTORY 12/23/2020:  She is seen again today in the office for follow up of a previous issue (as above). Since being seen last, the patient is doing better. She is ambulating today using a cane with a compression sock without a brace. The location and quality of the pain have not significantly changed since the last visit. INTERVAL HISTORY 2/12/2021:  She is seen again today in the office for evaluation of a new issue, pain at the above location (lateral malleolus), secondary to an injury that occurred around the end of January 2021, secondary to bumping her lateral ankle on a piece of furniture at home.  The pain is described mainly with mechanical terms (dull/sharp/throbbing). The pain is worse with activity and better with rest. The patient reports an associated swelling and superficial abrasion. Regarding the previous issue (ankle fracture with arthritis), she reports the previous problem is doing about the same overall. The location and quality of the pain have not significantly changed since the last visit. REVIEW OF SYSTEMS:   Constitutional: Negative for fever. HENT: Negative for tinnitus. Eyes: Negative for pain. Respiratory: Negative for shortness of breath. Cardiovascular: Negative for chest pain. Gastrointestinal: Negative for abdominal pain. Genitourinary: Negative for dysuria. Skin: Negative for rash. Neurological: Negative for headaches. Hematological: Does not bruise/bleed easily. Musculoskeletal: See HPI for pertinent positives     Past Medical History:    She  has a past medical history of Anemia, Anxiety, Depression, GERD (gastroesophageal reflux disease), Hypertension, Hypothyroidism, Insomnia, PTSD (post-traumatic stress disorder), and Vitiligo. Past Surgical History:    She  has a past surgical history that includes Hysterectomy; Dilation and curettage of uterus; Thyroid surgery; and Ankle fracture surgery (Left, 6/23/2020). Current Medications:     Current Outpatient Medications:     Handicap Placard MISC, by Does not apply route DISABLED PARKING PERMIT AUTHORIZATION Routine   Qty-1  The patient has the following condition(s) which qualifies him/her for disabled parking: Walking severely limited due to arthritic, neurological, or orthopedic condition  EXP 04/01/2021, Disp: 1 each, Rfl: 0    ALPRAZolam (XANAX) 0.25 MG tablet, Take 0.25 mg by mouth 2 times daily as needed. , Disp: , Rfl:     aspirin 325 MG EC tablet, Take 1 tablet by mouth daily, Disp: 42 tablet, Rfl: 0    aspirin 325 MG EC tablet, Take 1 tablet by mouth daily, Disp: 42 tablet, Rfl: 0   ondansetron (ZOFRAN) 4 MG tablet, Take 1 tablet by mouth every 12 hours as needed for Nausea or Vomiting, Disp: 20 tablet, Rfl: 0    docusate sodium (COLACE) 100 MG capsule, Take 1 capsule by mouth 2 times daily as needed for Constipation, Disp: 20 capsule, Rfl: 0    famotidine (PEPCID) 40 MG tablet, Take 40 mg by mouth nightly, Disp: , Rfl:     vitamin B-12 (CYANOCOBALAMIN) 1000 MCG tablet, Take 1,000 mcg by mouth daily, Disp: , Rfl:     hydrochlorothiazide (HYDRODIURIL) 25 MG tablet, take 1 tablet by mouth once daily, Disp: 90 tablet, Rfl: 0    levothyroxine (SYNTHROID) 25 MCG tablet, take 1 tablet by mouth once daily, Disp: 30 tablet, Rfl: 0    oxybutynin (DITROPAN XL) 5 MG CR tablet, Take 1 tablet by mouth daily, Disp: 90 tablet, Rfl: 3    busPIRone (BUSPAR) 10 MG tablet, Take 10 mg by mouth 2 times daily , Disp: , Rfl: 0    triamcinolone (KENALOG) 0.1 % cream, 2 times daily as needed (ezcema) , Disp: , Rfl: 0    buPROPion (WELLBUTRIN XL) 300 MG XL tablet, Take 300 mg by mouth every morning , Disp: , Rfl:     traZODone (DESYREL) 100 MG tablet, Take 100 mg by mouth nightly , Disp: , Rfl:     fluticasone (FLONASE) 50 MCG/ACT nasal spray, 2 sprays by Nasal route daily, Disp: 1 Bottle, Rfl: 3     Allergies:    Ampicillin, Erythromycin, Doxycycline, Paroxetine, Paroxetine hcl, Penicillins, and Sulfa antibiotics    Family History:  family history includes High Blood Pressure in her mother; Stroke in her mother.     Social History:   Social History     Occupational History    Not on file   Tobacco Use    Smoking status: Never Smoker    Smokeless tobacco: Never Used   Substance and Sexual Activity    Alcohol use: Yes     Comment: occas    Drug use: No    Sexual activity: Not Currently       OBJECTIVE:  Temp 96.7 °F (35.9 °C)   Resp 12   Ht 5' 5\" (1.651 m)   Wt 136 lb (61.7 kg)   BMI 22.63 kg/m²    Psych: alert and oriented to person, time, and place  Cardio:  well perfused extremities  Resp:  normal respiratory effort  Skin:  no cyanosis  Hem/lymph:  no lymphedema  Neuro:  sensation to light touch unchanged since last visit  Musculoskeletal:    MUSCULOSKELETAL (affected lower extremity):  Vascular: Toes warm and well perfused, compartments soft/compressible, no significant swelling of ankle/foot. Skin:  Intact over foot/ankle, without rash/lesions/AV malformations, except for superficial abrasion over the lateral aspect of the distal fibula. Motion: Able to wiggle toes   -Range of motion of foot grossly normal  -No tenderness at knee or proximal leg   -Tenderness to palpation: Mild laterally over the hardware, anterior ankle joint line  -Good painless ankle range of motion, but diminished        RADIOLOGY:   2/12/2021 FINDINGS:  Three weightbearing views (AP, Mortise, and Lateral) of the left ankle were obtained in the office today and reviewed, revealing intact hardware status post ORIF of trimalleolar ankle fracture. Interval healing noted without interval displacement. Degenerative changes of the tibiotalar joint with joint space narrowing, sclerosis, and osteophytes.     IMPRESSION:  Ankle fracture as above s/p ORIF.      Electronically signed by True Snow MD       ASSESSMENT AND PLAN:  Sara Forman was seen today for Ankle Pain (left ankle)  The primary encounter diagnosis was Closed trimalleolar fracture of left ankle with routine healing, subsequent encounter. Diagnoses of Post-traumatic arthritis of left ankle and Contusion of bone were also pertinent to this visit. Body mass index is 22.63 kg/m². She has a history of a left trimalleolar ankle fracture status post ORIF in June 2020, doing very well overall, but with some underlying posttraumatic tibiotalar arthritis. She also sustained a contusion of her lateral malleolus around 1/20/2021.     Notably, she has a history of anxiety/depression/PTSD, anemia, GERD, hypertension, hypothyroidism, and insomnia    I had another discussion today with the patient about the likely diagnosis and its natural history, physical exam and imaging findings, as well as various treatment options in detail. Orders/referrals were placed as below at today's visit. We discussed that she may continue her home exercises as needed, and using a cane/walker as needed to help with balance. She may continue to be weightbearing as tolerated, and does not need any further immobilization at this time. For her ankle arthritis, she will use ice and heat. After discussing all the options, the patient also wished to proceed with a left ankle injection as below. All questions were answered and the patient agrees with the above plan. The patient will return to clinic in 3 months without x-rays. At her next visit, depending on how she is doing, we may consider topical anesthetics and/or a custom brace. ANKLE INJECTION PROCEDURE NOTE: After discussing the risks/benefits/alternatives to injection, an informed consent was obtained verbally. The left tibiotalar joint was verified as the correct location and allergies were reviewed. The skin overlying the injection site was cleaned with an alcohol swab followed by a local prep with Chloraprep. A 25 gauge needle was introduced into the above location via the following approach:  anteromedial. A mixture of 40 mg of Kenalog and 2 mL of 1% Lidocaine without epinephrine was injected. The patient was noted to tolerate the procedure well without immediate complication.           At the patient's next visit, depending on how the patient is doing and/or new imaging/labs results, we may consider the following options:    []  Orthotic (OTC)     []  Orthotic (custom)          []  Rocker bottom shoes     []  Brace (OTC)        []  Brace (custom)             []  CAM boot        []  Night splint         []  Heel cups        []  Strap      []  Toe sleeves/splints    []  PT:                     [x]  Wean out of immobilization   [x] Advance activity       []  Topical               []  NSAIDs          []  Vanda         []  Referral:         []  Stress xrays       []  CT         []  MRI        []  Injection:         []  Consider OR      []  Pick OR date    Return in about 3 months (around 5/12/2021). No orders of the defined types were placed in this encounter. Orders Placed This Encounter   Procedures    XR ANKLE LEFT (MIN 3 VIEWS)     WEIGHT BEARING 3 VIEWS:  AP, MORTISE, LATERAL  Please include entire foot     Standing Status:   Future     Number of Occurrences:   1     Standing Expiration Date:   3/12/2021     Order Specific Question:   Reason for exam:     Answer:   ainsley Rae Mc, MD  Orthopedic Surgery        Please excuse any typos/errors, as this note was created with the assistance of voice recognition software. While intending to generate a document that actually reflects the content of the visit, the document can still have some errors including those of syntax and sound-a-like substitutions which may escape proof reading. In such instances, actual meaning can be extrapolated by context.

## 2021-05-14 ENCOUNTER — OFFICE VISIT (OUTPATIENT)
Dept: ORTHOPEDIC SURGERY | Age: 86
End: 2021-05-14
Payer: MEDICARE

## 2021-05-14 VITALS — HEIGHT: 65 IN | WEIGHT: 136 LBS | BODY MASS INDEX: 22.66 KG/M2 | TEMPERATURE: 97.2 F

## 2021-05-14 DIAGNOSIS — M79.672 LEFT FOOT PAIN: Primary | ICD-10-CM

## 2021-05-14 DIAGNOSIS — S82.852D CLOSED TRIMALLEOLAR FRACTURE OF LEFT ANKLE WITH ROUTINE HEALING, SUBSEQUENT ENCOUNTER: Primary | ICD-10-CM

## 2021-05-14 DIAGNOSIS — M19.172 POST-TRAUMATIC ARTHRITIS OF LEFT ANKLE: ICD-10-CM

## 2021-05-14 PROCEDURE — 99213 OFFICE O/P EST LOW 20 MIN: CPT | Performed by: ORTHOPAEDIC SURGERY

## 2021-05-14 PROCEDURE — 1090F PRES/ABSN URINE INCON ASSESS: CPT | Performed by: ORTHOPAEDIC SURGERY

## 2021-05-14 PROCEDURE — 1123F ACP DISCUSS/DSCN MKR DOCD: CPT | Performed by: ORTHOPAEDIC SURGERY

## 2021-05-14 PROCEDURE — G8400 PT W/DXA NO RESULTS DOC: HCPCS | Performed by: ORTHOPAEDIC SURGERY

## 2021-05-14 PROCEDURE — 4040F PNEUMOC VAC/ADMIN/RCVD: CPT | Performed by: ORTHOPAEDIC SURGERY

## 2021-05-14 PROCEDURE — G8420 CALC BMI NORM PARAMETERS: HCPCS | Performed by: ORTHOPAEDIC SURGERY

## 2021-05-14 PROCEDURE — 1036F TOBACCO NON-USER: CPT | Performed by: ORTHOPAEDIC SURGERY

## 2021-05-14 PROCEDURE — G8427 DOCREV CUR MEDS BY ELIG CLIN: HCPCS | Performed by: ORTHOPAEDIC SURGERY

## 2021-05-14 NOTE — PROGRESS NOTES
[facility]  [facility] Galeas  [address] [place] [address]  Dept: [phone]    Ambulatory Orthopedic Follow Up Visit    Preoperative Diagnosis:   1. Left closed ankle fracture (trimalleolar ankle fracture-dislocation)  2. Left distal tibia varus malunion  3. Hypothyroidism     Postoperative Diagnosis:   1. Same as above     Procedures Performed:  (6/23/2020)  1. Left ankle open reduction internal fixation of trimalleolar ankle fracture (medial, lateral, and posterior malleoli)    CHIEF COMPLAINT:    Chief Complaint   Patient presents with    Ankle Pain     Left         HISTORY OF PRESENT ILLNESS:       She is a 82 y.o. female, seen again today in the office for follow up of the above problem with a history of pain at the above location left ankle. The pain is described mainly with mechanical terms (dull/sharp/throbbing). The pain is worse with activity and better with rest. Since being seen last, the patient reports she had a fall on 10/22/2020 at home, and reports injuring her head as well as her left ankle. She was seen in the ER and a CT scan of her head was negative, but does report an increase in left ankle pain, localized to her lateral hindfoot. She also reports an associated increase in swelling. INTERVAL HISTORY 12/23/2020:  She is seen again today in the office for follow up of a previous issue (as above). Since being seen last, the patient is doing better. She is ambulating today using a cane with a compression sock without a brace. The location and quality of the pain have not significantly changed since the last visit. INTERVAL HISTORY 2/12/2021:  She is seen again today in the office for evaluation of a new issue, pain at the above location (lateral malleolus), secondary to an injury that occurred around the end of January 2021, secondary to bumping her lateral ankle on a piece of furniture at home.  The pain is described mainly with mechanical terms (dull/sharp/throbbing). The pain is worse with activity and better with rest. The patient reports an associated swelling and superficial abrasion. Regarding the previous issue (ankle fracture with arthritis), she reports the previous problem is doing about the same overall. The location and quality of the pain have not significantly changed since the last visit. INTERVAL HISTORY 5/14/2021:  She is seen again today in the office for follow up of a previous issue (as above). Since being seen last, the patient is doing well in terms of pain, but reports that she is doing worse overall (she does report some improvement back pain in her lumbar region secondary to a fall on 4/1/2021, for which she has been seen by a doctor and is going to physical therapy). At today's visit, she is not using a brace or assistive device. History is obtained today from:   [x]  the patient     []  EMR     []  one family member/friend    []  multiple family members/friends    []  other:        REVIEW OF SYSTEMS:   Constitutional: Negative for fever. HENT: Negative for tinnitus. Eyes: Negative for pain. Respiratory: Negative for shortness of breath. Cardiovascular: Negative for chest pain. Gastrointestinal: Negative for abdominal pain. Genitourinary: Negative for dysuria. Skin: Negative for rash. Neurological: Negative for headaches. Hematological: Does not bruise/bleed easily. Musculoskeletal: See HPI for pertinent positives     Past Medical History:    She  has a past medical history of Anemia, Anxiety, Depression, GERD (gastroesophageal reflux disease), Hypertension, Hypothyroidism, Insomnia, PTSD (post-traumatic stress disorder), and Vitiligo. Past Surgical History:    She  has a past surgical history that includes Hysterectomy; Dilation and curettage of uterus; Thyroid surgery; and Ankle fracture surgery (Left, 6/23/2020).      Current Medications:     Current Outpatient Medications:     Handicap [facility], by Does not apply route DISABLED PARKING PERMIT AUTHORIZATION Routine   Qty-1  The patient has the following condition(s) which qualifies him/her for disabled parking: Walking severely limited due to arthritic, neurological, or orthopedic condition  EXP 04/01/2021, Disp: 1 each, Rfl: 0    ALPRAZolam (XANAX) 0.25 MG tablet, Take 0.25 mg by mouth 2 times daily as needed. , Disp: , Rfl:     aspirin 325 MG EC tablet, Take 1 tablet by mouth daily, Disp: 42 tablet, Rfl: 0    aspirin 325 MG EC tablet, Take 1 tablet by mouth daily, Disp: 42 tablet, Rfl: 0    ondansetron (ZOFRAN) 4 MG tablet, Take 1 tablet by mouth every 12 hours as needed for Nausea or Vomiting, Disp: 20 tablet, Rfl: 0    docusate sodium (COLACE) 100 MG capsule, Take 1 capsule by mouth 2 times daily as needed for Constipation, Disp: 20 capsule, Rfl: 0    famotidine (PEPCID) 40 MG tablet, Take 40 mg by mouth nightly, Disp: , Rfl:     vitamin B-12 (CYANOCOBALAMIN) 1000 MCG tablet, Take 1,000 mcg by mouth daily, Disp: , Rfl:     hydrochlorothiazide (HYDRODIURIL) 25 MG tablet, take 1 tablet by mouth once daily, Disp: 90 tablet, Rfl: 0    levothyroxine (SYNTHROID) 25 MCG tablet, take 1 tablet by mouth once daily, Disp: 30 tablet, Rfl: 0    oxybutynin (DITROPAN XL) 5 MG CR tablet, Take 1 tablet by mouth daily, Disp: 90 tablet, Rfl: 3    busPIRone (BUSPAR) 10 MG tablet, Take 10 mg by mouth 2 times daily , Disp: , Rfl: 0    triamcinolone (KENALOG) 0.1 % cream, 2 times daily as needed (ezcema) , Disp: , Rfl: 0    buPROPion (WELLBUTRIN XL) 300 MG XL tablet, Take 300 mg by mouth every morning , Disp: , Rfl:     traZODone (DESYREL) 100 MG tablet, Take 100 mg by mouth nightly , Disp: , Rfl:     fluticasone (FLONASE) 50 MCG/ACT nasal spray, 2 sprays by Nasal route daily, Disp: 1 Bottle, Rfl: 3     Allergies:    Ampicillin, Erythromycin, Doxycycline, Paroxetine, Paroxetine hcl, Penicillins, and Sulfa antibiotics    Family History:  family history includes High Blood Pressure in her mother; Stroke in her mother. Social History:   Social History     Occupational History    Not on file   Tobacco Use    Smoking status: Never Smoker    Smokeless tobacco: Never Used   Substance and Sexual Activity    Alcohol use: Yes     Comment: occas    Drug use: No    Sexual activity: Not Currently       OBJECTIVE:  Temp 97.2 °F (36.2 °C)   Ht 5' 5\" (1.651 m)   Wt 136 lb (61.7 kg)   BMI 22.63 kg/m²    Psych: alert and oriented to person, time, and place  Cardio:  well perfused extremities  Resp:  normal respiratory effort  Skin:  no cyanosis  Hem/lymph:  no lymphedema  Neuro:  sensation to light touch unchanged since last visit  Musculoskeletal:    MUSCULOSKELETAL (affected lower extremity):  Vascular: Toes warm and well perfused, compartments soft/compressible, no significant swelling of ankle/foot. Skin:  Intact over foot/ankle, without rash/lesions/AV malformations  Motion: Able to wiggle toes   -Range of motion of foot grossly normal  -No tenderness at knee or proximal leg   -Tenderness to palpation: Mild laterally over the hardware, anterior ankle joint line--mild  -Good painless ankle range of motion, but diminished        RADIOLOGY:   5/14/2021 FINDINGS:  Three weightbearing views (AP, Mortise, and Lateral) of the left ankle and 3 weightbearing views (AP, oblique, lateral) of the left foot were obtained in the office today and reviewed, revealing intact hardware status post ORIF of trimalleolar ankle fracture. Degenerative changes of the tibiotalar joint with joint space narrowing, sclerosis, and osteophytes. No significant interval change.     IMPRESSION:  Ankle fracture as above s/p ORIF. Degenerative changes as above.     Electronically signed by Lenore Barrios MD       ASSESSMENT AND PLAN:    Body mass index is 22.63 kg/m².        She has a history of a left trimalleolar ankle fracture status post ORIF in June 2020, doing very well overall, but with some underlying posttraumatic tibiotalar arthritis (a left ankle injection on 2/12/2021 helped her pain approximately 75%). She does report some stiffness in her ankle, but reports that her pain is not significantly bothering her (she does report a new injury on 4/1/2021, but she has no evidence radiographically on exam of a new injury to her foot/ankle). Notably, she has a history of anxiety/depression/PTSD, anemia, GERD, hypertension, hypothyroidism, and insomnia. I had another discussion today with the patient about the likely diagnosis and its natural history, physical exam and imaging findings, as well as various treatment options in detail. Surgically, we discussed that I did not recommend any surgical intervention at this time, and recommended continuing conservative management. Orders/referrals were placed as below at today's visit. We discussed that she will continue her physical therapy for her low back pain, and she will also continue home exercises for her ankle. I also recommended that she continue to walk, and use a cane/walker as needed for balance. We did discuss the possibility of another left ankle injection, however, she does not have significant pain at this time, and thus we decided against that. All questions were answered and the patient agrees with the above plan. The patient will return to clinic in the future as needed. At her next visit, depending on how she is doing, we may consider topical anesthetics, a custom brace, and/or repeat ankle injection.              At the patient's next visit, depending on how the patient is doing and/or new imaging/labs results, we may consider the following options:    []  Orthotic (OTC)     []  Orthotic (custom)          []  Rocker bottom shoes     []  Brace (OTC)        []  Brace (custom)             []  CAM boot        []  Night splint         []  Heel cups        []  Strap      []  Toe sleeves/splints    []  PT:                     [x]  Wean out of immobilization   [x]  Advance activity       []  Topical               []  NSAIDs          []  Vanda         []  Referral:         []  Stress xrays       []  CT         []  MRI        []  Injection:         []  Consider OR      []  Pick OR date    Return if symptoms worsen or fail to improve. No orders of the defined types were placed in this encounter. No orders of the defined types were placed in this encounter. Levi Resendez MD  Orthopedic Surgery        Please excuse any typos/errors, as this note was created with the assistance of voice recognition software. While intending to generate a document that actually reflects the content of the visit, the document can still have some errors including those of syntax and sound-a-like substitutions which may escape proof reading. In such instances, actual meaning can be extrapolated by context.

## 2021-07-02 ENCOUNTER — OFFICE VISIT (OUTPATIENT)
Dept: ORTHOPEDIC SURGERY | Age: 86
End: 2021-07-02
Payer: MEDICARE

## 2021-07-02 VITALS — TEMPERATURE: 97.6 F | HEIGHT: 65 IN | RESPIRATION RATE: 12 BRPM | BODY MASS INDEX: 22.66 KG/M2 | WEIGHT: 136 LBS

## 2021-07-02 DIAGNOSIS — M54.17 RADICULOPATHY, LUMBOSACRAL REGION: ICD-10-CM

## 2021-07-02 DIAGNOSIS — M19.172 POST-TRAUMATIC ARTHRITIS OF LEFT ANKLE: Primary | ICD-10-CM

## 2021-07-02 PROCEDURE — G8420 CALC BMI NORM PARAMETERS: HCPCS | Performed by: ORTHOPAEDIC SURGERY

## 2021-07-02 PROCEDURE — 99213 OFFICE O/P EST LOW 20 MIN: CPT | Performed by: ORTHOPAEDIC SURGERY

## 2021-07-02 PROCEDURE — 1090F PRES/ABSN URINE INCON ASSESS: CPT | Performed by: ORTHOPAEDIC SURGERY

## 2021-07-02 PROCEDURE — G8427 DOCREV CUR MEDS BY ELIG CLIN: HCPCS | Performed by: ORTHOPAEDIC SURGERY

## 2021-07-02 PROCEDURE — 1036F TOBACCO NON-USER: CPT | Performed by: ORTHOPAEDIC SURGERY

## 2021-07-02 PROCEDURE — 1123F ACP DISCUSS/DSCN MKR DOCD: CPT | Performed by: ORTHOPAEDIC SURGERY

## 2021-07-02 PROCEDURE — G8400 PT W/DXA NO RESULTS DOC: HCPCS | Performed by: ORTHOPAEDIC SURGERY

## 2021-07-02 PROCEDURE — 20605 DRAIN/INJ JOINT/BURSA W/O US: CPT | Performed by: ORTHOPAEDIC SURGERY

## 2021-07-02 PROCEDURE — 4040F PNEUMOC VAC/ADMIN/RCVD: CPT | Performed by: ORTHOPAEDIC SURGERY

## 2021-07-02 RX ORDER — LIDOCAINE HYDROCHLORIDE 10 MG/ML
20 INJECTION, SOLUTION INFILTRATION; PERINEURAL ONCE
Status: COMPLETED | OUTPATIENT
Start: 2021-07-02 | End: 2021-07-02

## 2021-07-02 RX ORDER — TRIAMCINOLONE ACETONIDE 40 MG/ML
40 INJECTION, SUSPENSION INTRA-ARTICULAR; INTRAMUSCULAR ONCE
Status: COMPLETED | OUTPATIENT
Start: 2021-07-02 | End: 2021-07-02

## 2021-07-02 RX ADMIN — LIDOCAINE HYDROCHLORIDE 20 ML: 10 INJECTION, SOLUTION INFILTRATION; PERINEURAL at 13:16

## 2021-07-02 RX ADMIN — TRIAMCINOLONE ACETONIDE 40 MG: 40 INJECTION, SUSPENSION INTRA-ARTICULAR; INTRAMUSCULAR at 13:15

## 2021-07-02 NOTE — PROGRESS NOTES
[facility]  [facility]  [address] [address]  Dept: [phone]    Ambulatory Orthopedic Follow Up Visit    Preoperative Diagnosis:   1. Left closed ankle fracture (trimalleolar ankle fracture-dislocation)  2. Left distal tibia varus malunion  3. Hypothyroidism     Postoperative Diagnosis:   1. Same as above     Procedures Performed:  (6/23/2020)  1. Left ankle open reduction internal fixation of trimalleolar ankle fracture (medial, lateral, and posterior malleoli)    CHIEF COMPLAINT:    Chief Complaint   Patient presents with    Ankle Pain     left     Hip Pain     left         HISTORY OF PRESENT ILLNESS:       She is a 82 y.o. female, seen again today in the office for follow up of the above problem with a history of pain at the above location left ankle. The pain is described mainly with mechanical terms (dull/sharp/throbbing). The pain is worse with activity and better with rest. Since being seen last, the patient reports she had a fall on 10/22/2020 at home, and reports injuring her head as well as her left ankle. She was seen in the ER and a CT scan of her head was negative, but does report an increase in left ankle pain, localized to her lateral hindfoot. She also reports an associated increase in swelling. INTERVAL HISTORY 12/23/2020:  She is seen again today in the office for follow up of a previous issue (as above). Since being seen last, the patient is doing better. She is ambulating today using a cane with a compression sock without a brace. The location and quality of the pain have not significantly changed since the last visit. INTERVAL HISTORY 2/12/2021:  She is seen again today in the office for evaluation of a new issue, pain at the above location (lateral malleolus), secondary to an injury that occurred around the end of January 2021, secondary to bumping her lateral ankle on a piece of furniture at home.  The pain is described mainly with mechanical terms (dull/sharp/throbbing). The pain is worse with activity and better with rest. The patient reports an associated swelling and superficial abrasion. Regarding the previous issue (ankle fracture with arthritis), she reports the previous problem is doing about the same overall. The location and quality of the pain have not significantly changed since the last visit. INTERVAL HISTORY 5/14/2021:  She is seen again today in the office for follow up of a previous issue (as above). Since being seen last, the patient is doing well in terms of pain, but reports that she is doing worse overall (she does report some improvement back pain in her lumbar region secondary to a fall on 4/1/2021, for which she has been seen by a doctor and is going to physical therapy). At today's visit, she is not using a brace or assistive device. History is obtained today from:   [x]  the patient     []  EMR     []  one family member/friend    []  multiple family members/friends    []  other:      INTERVAL HISTORY 7/2/2021:  She is seen again today in the office for evaluation of a new issue as above (left lumbosacral pain), which began in April 2021 secondary to a trip and fall over her dog  . At today's visit, she is using no brace/assistive device. She reports an associated burning pain that radiates down her buttock/thigh into her leg. History is obtained today from:   [x]  the patient     []  EMR     []  one family member/friend    []  multiple family members/friends    []  other:      Regarding the previous issue, she reports the previous problem is doing better. She still reports some anterior ankle pain, which bothers her somewhat. REVIEW OF SYSTEMS:   Constitutional: Negative for fever. HENT: Negative for tinnitus. Eyes: Negative for pain. Respiratory: Negative for shortness of breath. Cardiovascular: Negative for chest pain.    Gastrointestinal: Negative for abdominal pain. Genitourinary: Negative for dysuria. Skin: Negative for rash. Neurological: Negative for headaches. Hematological: Does not bruise/bleed easily. Musculoskeletal: See HPI for pertinent positives     Past Medical History:    She  has a past medical history of Anemia, Anxiety, Depression, GERD (gastroesophageal reflux disease), Hypertension, Hypothyroidism, Insomnia, PTSD (post-traumatic stress disorder), and Vitiligo. Past Surgical History:    She  has a past surgical history that includes Hysterectomy; Dilation and curettage of uterus; Thyroid surgery; and Ankle fracture surgery (Left, 6/23/2020). Current Medications:     Current Outpatient Medications:     Handicap Placard MISC, by Does not apply route DISABLED PARKING PERMIT AUTHORIZATION Routine   Qty-1  The patient has the following condition(s) which qualifies him/her for disabled parking: Walking severely limited due to arthritic, neurological, or orthopedic condition  EXP 04/01/2021, Disp: 1 each, Rfl: 0    ALPRAZolam (XANAX) 0.25 MG tablet, Take 0.25 mg by mouth 2 times daily as needed. , Disp: , Rfl:     aspirin 325 MG EC tablet, Take 1 tablet by mouth daily, Disp: 42 tablet, Rfl: 0    aspirin 325 MG EC tablet, Take 1 tablet by mouth daily, Disp: 42 tablet, Rfl: 0    ondansetron (ZOFRAN) 4 MG tablet, Take 1 tablet by mouth every 12 hours as needed for Nausea or Vomiting, Disp: 20 tablet, Rfl: 0    docusate sodium (COLACE) 100 MG capsule, Take 1 capsule by mouth 2 times daily as needed for Constipation, Disp: 20 capsule, Rfl: 0    famotidine (PEPCID) 40 MG tablet, Take 40 mg by mouth nightly, Disp: , Rfl:     vitamin B-12 (CYANOCOBALAMIN) 1000 MCG tablet, Take 1,000 mcg by mouth daily, Disp: , Rfl:     hydrochlorothiazide (HYDRODIURIL) 25 MG tablet, take 1 tablet by mouth once daily, Disp: 90 tablet, Rfl: 0    levothyroxine (SYNTHROID) 25 MCG tablet, take 1 tablet by mouth once daily, Disp: 30 tablet, Rfl: 0   oxybutynin (DITROPAN XL) 5 MG CR tablet, Take 1 tablet by mouth daily, Disp: 90 tablet, Rfl: 3    busPIRone (BUSPAR) 10 MG tablet, Take 10 mg by mouth 2 times daily , Disp: , Rfl: 0    triamcinolone (KENALOG) 0.1 % cream, 2 times daily as needed (ezcema) , Disp: , Rfl: 0    buPROPion (WELLBUTRIN XL) 300 MG XL tablet, Take 300 mg by mouth every morning , Disp: , Rfl:     traZODone (DESYREL) 100 MG tablet, Take 100 mg by mouth nightly , Disp: , Rfl:     fluticasone (FLONASE) 50 MCG/ACT nasal spray, 2 sprays by Nasal route daily, Disp: 1 Bottle, Rfl: 3     Allergies:    Ampicillin, Erythromycin, Doxycycline, Paroxetine, Paroxetine hcl, Penicillins, and Sulfa antibiotics    Family History:  family history includes High Blood Pressure in her mother; Stroke in her mother. Social History:   Social History     Occupational History    Not on file   Tobacco Use    Smoking status: Never Smoker    Smokeless tobacco: Never Used   Vaping Use    Vaping Use: Never used   Substance and Sexual Activity    Alcohol use: Yes     Comment: occas    Drug use: No    Sexual activity: Not Currently       OBJECTIVE:  Temp 97.6 °F (36.4 °C)   Resp 12   Ht 5' 5\" (1.651 m)   Wt 136 lb (61.7 kg)   BMI 22.63 kg/m²    Psych: alert and oriented to person, time, and place  Cardio:  well perfused extremities  Resp:  normal respiratory effort  Skin:  no cyanosis  Hem/lymph:  no lymphedema  Neuro:  sensation to light touch unchanged since last visit  Musculoskeletal:    MUSCULOSKELETAL (affected lower extremity):  Vascular: Toes warm and well perfused, compartments soft/compressible, no significant swelling of ankle/foot.   Skin:  Intact over foot/ankle, without rash/lesions/AV malformations  Motion: Able to wiggle toes   -Range of motion of foot grossly normal  -No tenderness at knee or proximal leg   -Tenderness to palpation: Mild laterally over the hardware, anterior ankle joint line--mild  -Good painless ankle range of motion, but diminished        RADIOLOGY:   7/2/2021 No new radiology images today. Prior images reviewed for reference. FINDINGS:  Three weightbearing views (AP, Mortise, and Lateral) of the left ankle and 3 weightbearing views (AP, oblique, lateral) of the left foot were obtained in the office today and reviewed, revealing intact hardware status post ORIF of trimalleolar ankle fracture. Degenerative changes of the tibiotalar joint with joint space narrowing, sclerosis, and osteophytes. No significant interval change.     IMPRESSION:  Ankle fracture as above s/p ORIF. Degenerative changes as above.     Electronically signed by Ahmet Guy MD       ASSESSMENT AND PLAN:    Body mass index is 22.63 kg/m². She has a history of a left trimalleolar ankle fracture status post ORIF in June 2020, doing very well overall, but with some underlying posttraumatic tibiotalar arthritis (a left ankle injection on 2/12/2021 helped her pain approximately 75%). She also reports a history of a recent x-ray which showed a compression fracture, and reports that she is getting an MRI to evaluate this next week. Notably, she has a history of anxiety/depression/PTSD, anemia, GERD, hypertension, hypothyroidism, and insomnia. I had another discussion today with the patient about the likely diagnosis and its natural history, physical exam and imaging findings, as well as various treatment options in detail. Surgically, I recommended conservative management for her ankle, and did not recommend any further surgical invention at this time. Orders/referrals were placed as below at today's visit. The patient was cautioned to avoid pain provoking activity and physical therapy for the time being. The patient was also referred to an orthopedic spine surgeon for her back. She was also encouraged to follow-up with her MRI next week as she plans to.     After discussing her options as above, she did wish to proceed with a left ankle injection as below. All questions were answered and the patient agrees with the above plan. The patient will return to clinic in the future as needed. At her next visit, depending on how she is doing, we may consider topical anesthetics, a custom brace, and a possible repeat ankle injection. ANKLE INJECTION PROCEDURE NOTE: After discussing the risks/benefits/alternatives to injection, an informed consent was obtained verbally. The left tibiotalar joint was verified as the correct location and allergies were reviewed. The skin overlying the injection site was cleaned with an alcohol swab followed by a local sterile prep. A 25 gauge needle was introduced into the above location under sterile conditions. A mixture of 40 mg of Kenalog and 2 mL of 1% Lidocaine without epinephrine was injected. The patient was noted to tolerate the procedure well without immediate complication. A dressing was applied and verbal instruction/education was provided. At the patient's next visit, depending on how the patient is doing and/or new imaging/labs results, we may consider the following options:    []  Orthotic (OTC)     []  Orthotic (custom)          []  Rocker bottom shoes     []  Brace (OTC)        []  Brace (custom)             []  CAM boot        []  Night splint         []  Heel cups        []  Strap      []  Toe sleeves/splints    []  PT:                     [x]  Wean out of immobilization   [x]  Advance activity       []  Topical               []  NSAIDs          []  Vanda         []  Referral:         []  Stress xrays       []  CT         []  MRI        []  Injection:         []  Consider OR      []  Pick OR date    No follow-ups on file.     Orders Placed This Encounter   Medications    triamcinolone acetonide (KENALOG-40) injection 40 mg    lidocaine 1 % injection 20 mL     Orders Placed This Encounter   Procedures    RUBEN - Lora Brooks MD, Orthopedic Surgery, [place]     Referral Priority:   Routine     Referral Type:   Eval and Treat     Referral Reason:   Specialty Services Required     Referred to Provider:   Florentin Herron MD     Requested Specialty:   Orthopedic Surgery     Number of Visits Requested:   1         Joselyn Betancourt MD  Orthopedic Surgery        Please excuse any typos/errors, as this note was created with the assistance of voice recognition software. While intending to generate a document that actually reflects the content of the visit, the document can still have some errors including those of syntax and sound-a-like substitutions which may escape proof reading. In such instances, actual meaning can be extrapolated by context.

## 2022-02-08 ENCOUNTER — OFFICE VISIT (OUTPATIENT)
Dept: ORTHOPEDIC SURGERY | Age: 87
End: 2022-02-08
Payer: MEDICARE

## 2022-02-08 VITALS — HEIGHT: 65 IN | RESPIRATION RATE: 12 BRPM | WEIGHT: 136 LBS | BODY MASS INDEX: 22.66 KG/M2

## 2022-02-08 DIAGNOSIS — S82.852D CLOSED TRIMALLEOLAR FRACTURE OF LEFT ANKLE WITH ROUTINE HEALING, SUBSEQUENT ENCOUNTER: ICD-10-CM

## 2022-02-08 DIAGNOSIS — M19.172 POST-TRAUMATIC ARTHRITIS OF LEFT ANKLE: Primary | ICD-10-CM

## 2022-02-08 PROCEDURE — G8484 FLU IMMUNIZE NO ADMIN: HCPCS | Performed by: ORTHOPAEDIC SURGERY

## 2022-02-08 PROCEDURE — G8400 PT W/DXA NO RESULTS DOC: HCPCS | Performed by: ORTHOPAEDIC SURGERY

## 2022-02-08 PROCEDURE — 20605 DRAIN/INJ JOINT/BURSA W/O US: CPT | Performed by: ORTHOPAEDIC SURGERY

## 2022-02-08 PROCEDURE — G8420 CALC BMI NORM PARAMETERS: HCPCS | Performed by: ORTHOPAEDIC SURGERY

## 2022-02-08 PROCEDURE — 1036F TOBACCO NON-USER: CPT | Performed by: ORTHOPAEDIC SURGERY

## 2022-02-08 PROCEDURE — 1123F ACP DISCUSS/DSCN MKR DOCD: CPT | Performed by: ORTHOPAEDIC SURGERY

## 2022-02-08 PROCEDURE — 99213 OFFICE O/P EST LOW 20 MIN: CPT | Performed by: ORTHOPAEDIC SURGERY

## 2022-02-08 PROCEDURE — G8427 DOCREV CUR MEDS BY ELIG CLIN: HCPCS | Performed by: ORTHOPAEDIC SURGERY

## 2022-02-08 PROCEDURE — 1090F PRES/ABSN URINE INCON ASSESS: CPT | Performed by: ORTHOPAEDIC SURGERY

## 2022-02-08 PROCEDURE — 4040F PNEUMOC VAC/ADMIN/RCVD: CPT | Performed by: ORTHOPAEDIC SURGERY

## 2022-02-08 RX ORDER — LIDOCAINE HYDROCHLORIDE 10 MG/ML
2 INJECTION, SOLUTION INFILTRATION; PERINEURAL ONCE
Status: COMPLETED | OUTPATIENT
Start: 2022-02-08 | End: 2022-02-08

## 2022-02-08 RX ORDER — TRIAMCINOLONE ACETONIDE 40 MG/ML
40 INJECTION, SUSPENSION INTRA-ARTICULAR; INTRAMUSCULAR ONCE
Status: COMPLETED | OUTPATIENT
Start: 2022-02-08 | End: 2022-02-08

## 2022-02-08 RX ADMIN — TRIAMCINOLONE ACETONIDE 40 MG: 40 INJECTION, SUSPENSION INTRA-ARTICULAR; INTRAMUSCULAR at 11:28

## 2022-02-08 RX ADMIN — LIDOCAINE HYDROCHLORIDE 2 ML: 10 INJECTION, SOLUTION INFILTRATION; PERINEURAL at 11:28

## 2022-02-08 NOTE — PROGRESS NOTES
[address] [place] [address] AND SPORTS MEDICINE  [facility] Raegan Zurita  [address] [facility] [address]  Dept: [phone]    Ambulatory Orthopedic Follow Up Visit    Preoperative Diagnosis:   1. Left closed ankle fracture (trimalleolar ankle fracture-dislocation)  2. Left distal tibia varus malunion  3. Hypothyroidism     Postoperative Diagnosis:   1. Same as above     Procedures Performed:  (6/23/2020)  1. Left ankle open reduction internal fixation of trimalleolar ankle fracture (medial, lateral, and posterior malleoli)    CHIEF COMPLAINT:    Chief Complaint   Patient presents with    Ankle Pain     left         HISTORY OF PRESENT ILLNESS:       She is a 82 y.o. female, seen again today in the office for follow up of the above problem with a history of pain at the above location left ankle. The pain is described mainly with mechanical terms (dull/sharp/throbbing). The pain is worse with activity and better with rest. Since being seen last, the patient reports she had a fall on 10/22/2020 at home, and reports injuring her head as well as her left ankle. She was seen in the ER and a CT scan of her head was negative, but does report an increase in left ankle pain, localized to her lateral hindfoot. She also reports an associated increase in swelling. INTERVAL HISTORY 12/23/2020:  She is seen again today in the office for follow up of a previous issue (as above). Since being seen last, the patient is doing better. She is ambulating today using a cane with a compression sock without a brace. The location and quality of the pain have not significantly changed since the last visit.      INTERVAL HISTORY 2/12/2021:  She is seen again today in the office for evaluation of a new issue, pain at the above location (lateral malleolus), secondary to an injury that occurred around the end of January 2021, secondary to bumping her lateral ankle on a piece of furniture at home. The pain is described mainly with mechanical terms (dull/sharp/throbbing). The pain is worse with activity and better with rest. The patient reports an associated swelling and superficial abrasion. Regarding the previous issue (ankle fracture with arthritis), she reports the previous problem is doing about the same overall. The location and quality of the pain have not significantly changed since the last visit. INTERVAL HISTORY 5/14/2021:  She is seen again today in the office for follow up of a previous issue (as above). Since being seen last, the patient is doing well in terms of pain, but reports that she is doing worse overall (she does report some improvement back pain in her lumbar region secondary to a fall on 4/1/2021, for which she has been seen by a doctor and is going to physical therapy). At today's visit, she is not using a brace or assistive device. History is obtained today from:   [x]  the patient     []  EMR     []  one family member/friend    []  multiple family members/friends    []  other:      INTERVAL HISTORY 7/2/2021:  She is seen again today in the office for evaluation of a new issue as above (left lumbosacral pain), which began in April 2021 secondary to a trip and fall over her dog  . At today's visit, she is using no brace/assistive device. She reports an associated burning pain that radiates down her buttock/thigh into her leg. History is obtained today from:   [x]  the patient     []  EMR     []  one family member/friend    []  multiple family members/friends    []  other:      Regarding the previous issue, she reports the previous problem is doing better. She still reports some anterior ankle pain, which bothers her somewhat. INTERVAL HISTORY 2/8/2022:  She is seen again today in the office for follow up of a previous issue (as above). Since being seen last, the patient is doing better.  At today's visit, she is not using a brace or assistive device. History is obtained today from:   [x]  the patient     []  EMR     []  one family member/friend    []  multiple family members/friends    []  other:      She reports some left ankle soreness at the end of the day. REVIEW OF SYSTEMS:   Musculoskeletal: See HPI for pertinent positives     Past Medical History:    She  has a past medical history of Anemia, Anxiety, Depression, GERD (gastroesophageal reflux disease), Hypertension, Hypothyroidism, Insomnia, PTSD (post-traumatic stress disorder), and Vitiligo. Past Surgical History:    She  has a past surgical history that includes Hysterectomy; Dilation and curettage of uterus; Thyroid surgery; and Ankle fracture surgery (Left, 6/23/2020). Current Medications:     Current Outpatient Medications:     diclofenac sodium (VOLTAREN) 1 % GEL, Apply 2 g topically 2 times daily, Disp: 100 g, Rfl: 0    Handicap Placard MISC, by Does not apply route DISABLED PARKING PERMIT AUTHORIZATION Routine   Qty-1  The patient has the following condition(s) which qualifies him/her for disabled parking: Walking severely limited due to arthritic, neurological, or orthopedic condition  EXP 04/01/2021, Disp: 1 each, Rfl: 0    ALPRAZolam (XANAX) 0.25 MG tablet, Take 0.25 mg by mouth 2 times daily as needed. , Disp: , Rfl:     aspirin 325 MG EC tablet, Take 1 tablet by mouth daily, Disp: 42 tablet, Rfl: 0    aspirin 325 MG EC tablet, Take 1 tablet by mouth daily, Disp: 42 tablet, Rfl: 0    ondansetron (ZOFRAN) 4 MG tablet, Take 1 tablet by mouth every 12 hours as needed for Nausea or Vomiting, Disp: 20 tablet, Rfl: 0    docusate sodium (COLACE) 100 MG capsule, Take 1 capsule by mouth 2 times daily as needed for Constipation, Disp: 20 capsule, Rfl: 0    famotidine (PEPCID) 40 MG tablet, Take 40 mg by mouth nightly, Disp: , Rfl:     vitamin B-12 (CYANOCOBALAMIN) 1000 MCG tablet, Take 1,000 mcg by mouth daily, Disp: , Rfl:     hydrochlorothiazide (HYDRODIURIL) 25 MG tablet, take 1 tablet by mouth once daily, Disp: 90 tablet, Rfl: 0    levothyroxine (SYNTHROID) 25 MCG tablet, take 1 tablet by mouth once daily, Disp: 30 tablet, Rfl: 0    oxybutynin (DITROPAN XL) 5 MG CR tablet, Take 1 tablet by mouth daily, Disp: 90 tablet, Rfl: 3    busPIRone (BUSPAR) 10 MG tablet, Take 10 mg by mouth 2 times daily , Disp: , Rfl: 0    triamcinolone (KENALOG) 0.1 % cream, 2 times daily as needed (ezcema) , Disp: , Rfl: 0    buPROPion (WELLBUTRIN XL) 300 MG XL tablet, Take 300 mg by mouth every morning , Disp: , Rfl:     traZODone (DESYREL) 100 MG tablet, Take 100 mg by mouth nightly , Disp: , Rfl:     fluticasone (FLONASE) 50 MCG/ACT nasal spray, 2 sprays by Nasal route daily, Disp: 1 Bottle, Rfl: 3     Allergies:    Ampicillin, Erythromycin, Doxycycline, Paroxetine, Paroxetine hcl, Penicillins, and Sulfa antibiotics    Family History:  family history includes High Blood Pressure in her mother; Stroke in her mother. Social History:   Social History     Occupational History    Not on file   Tobacco Use    Smoking status: Never Smoker    Smokeless tobacco: Never Used   Vaping Use    Vaping Use: Never used   Substance and Sexual Activity    Alcohol use: Yes     Comment: occas    Drug use: No    Sexual activity: Not Currently       OBJECTIVE:  Resp 12   Ht 5' 5\" (1.651 m)   Wt 136 lb (61.7 kg)   BMI 22.63 kg/m²    Psych: alert and oriented to person, time, and place  Cardio:  well perfused extremities  Resp:  normal respiratory effort  RLE:  Vascular: Limb well perfused, compartments soft/compressible. Skin: No erythema/ulcers. Intact. Neurovascular Status:  Grossly neurovascularly intact throughout   Tenderness to Palpation:    -      LLE:  Vascular: Limb well perfused, compartments soft/compressible. Skin: No erythema/ulcers. Intact.    Neurovascular Status:  Grossly neurovascularly intact throughout   Tenderness to Palpation: Ankle joint line, hardware--mild  -Diminished ankle range of motion, painless      RADIOLOGY:   2/8/2022 No new radiology images today. Prior images reviewed for reference. FINDINGS:  Three weightbearing views (AP, Mortise, and Lateral) of the left ankle and 3 weightbearing views (AP, oblique, lateral) of the left foot were obtained in the office today and reviewed, revealing intact hardware status post ORIF of trimalleolar ankle fracture. Degenerative changes of the tibiotalar joint with joint space narrowing, sclerosis, and osteophytes. No significant interval change.     IMPRESSION:  Ankle fracture as above s/p ORIF. Degenerative changes as above.     Electronically signed by Kulwant Ureña MD       ASSESSMENT AND PLAN:  Body mass index is 22.63 kg/m². She has a history of a left trimalleolar ankle fracture status post ORIF in June 2020, doing very well overall, but with some underlying posttraumatic tibiotalar arthritis (a left ankle injection on 2/12/2021 helped her pain approximately 75%; a left ankle injection on 7/2/2021 helped her pain approximately 100% for about 3 months). Notably, she has a history of anxiety/depression/PTSD, anemia, GERD, hypertension, hypothyroidism, and insomnia. I had another discussion today with the patient about the likely diagnosis and its natural history, physical exam and imaging findings, as well as various treatment options in detail. Surgically, we discussed the possible future left ankle removal of hardware with total ankle replacement, depending on her clinical course. At today's visit, we did decide to continue with conservative management. Orders/referrals were placed as below at today's visit. The patient was referred to prosthetics and orthotics obtain a custom [place] brace. She was ordered compression socks to obtain as needed. I provided a prescription for Voltaren (4g TOPL q QID PRN pain).      -After discussing her nonsurgical and surgical treatment options, she wished to proceed with a left ankle injection as below. All questions were answered and the patient agrees with the above plan. The patient will return to clinic in the future as needed. At her next visit, we may consider a repeat ankle injection versus possibly surgery as above. ANKLE INJECTION PROCEDURE NOTE: After discussing the risks/benefits/alternatives to injection, an informed consent was obtained verbally. The left tibiotalar joint was verified as the correct location and allergies were reviewed. The skin overlying the injection site was cleaned with an alcohol swab followed by a local sterile prep. A 25 gauge needle was introduced into the above location under sterile conditions. A mixture of 40 mg of Kenalog and 2 mL of 1% Lidocaine without epinephrine was injected. The patient was noted to tolerate the procedure well without immediate complication. A dressing was applied and verbal instruction/education was provided. At the patient's next visit, depending on how the patient is doing and/or new imaging/labs results, we may consider the following options:    []  Orthotic (OTC)     []  Orthotic (custom)          []  Rocker bottom shoes     []  Brace (OTC)        []  Brace (custom)             []  CAM boot        []  Night splint         []  Heel cups        []  Strap      []  Toe sleeves/splints    []  PT:                     [x]  Wean out of immobilization   [x]  Advance activity       []  Topical               []  NSAIDs          []  Vanda         []  Referral:         []  Stress xrays       []  CT         []  MRI        []  Injection:         []  Consider OR      []  Pick OR date    No follow-ups on file. Orders Placed This Encounter   Medications    triamcinolone acetonide (KENALOG-40) injection 40 mg    lidocaine 1 % injection 2 mL     Orders Placed This Encounter   Procedures    DME Order for Orthosis as OP     Eval and treat. Please provide custom [place] brace.     Kenneth Pedroza Дмитрий Mathis MD  Orthopedic Surgery, Foot and Ankle    DME Order for (Specify) as OP     DME: compression stockings (20-30 mm Hg)  Routine, External, Referral By - Joselyn Qureshi, Qty-1, Supply Name: Compression Stockings Prognosis: good Estimated length of need: 80         Anastacio Whyte MD  Orthopedic Surgery        Please excuse any typos/errors, as this note was created with the assistance of voice recognition software. While intending to generate a document that actually reflects the content of the visit, the document can still have some errors including those of syntax and sound-a-like substitutions which may escape proof reading. In such instances, actual meaning can be extrapolated by context.

## 2022-02-10 ENCOUNTER — TELEPHONE (OUTPATIENT)
Dept: ORTHOPEDIC SURGERY | Age: 87
End: 2022-02-10

## 2023-03-16 NOTE — PLAN OF CARE
3/16/2023  Patient Name: Andre Bashir  YOB: 1951          MRN number:  [id number]  Referring Physician:  CLEO Hernandez/Dr. Terry Cool    Dx:      Status post 2023: Dr. Terry Cool: L4 and L5 laminectomies with medial facetectomies      Authorized # of Visits:  10 visits on POC          Next MD visit: none   Fall Risk: standard         Precautions:  S/p lumbar spine surgery - post surgical precautions  Medication Changes since last visit?: No    Subjective: States she talked to the surgeon's office who gave her a medrol dose pack and she is starting to feel better. States she was told that this happens off. Pain Ratin/10 VAS     Objective:     STRENGTH:   5/5 MMT Scale EVAL/2023   Left  Right Comments   Hip Flexion (L2) 5/5  4+/5    Knee Extension (L3) 4+/5 4+/5    Knee Flexion 5/5 5/5    Ankle DF (L4) 5/5 4+/5    EHL (L5) 5/5 4/5    Ankle PF (S1) 5/5 4/5    Hip Abduction NT NT    Hip Extension NT NT           3/2/2023  Visit #5 3/6/2023  Visit #6 3/9/2023  Visit #7 3/13/2023  Visit #8 3/16/2023  Visit #9   Manual Therapy STM bilateral QL's and incisional area/R piriformis area    Neutral flossing R LE    Ankle joint mobs R     STM bilateral QL's and incisional area/R piriformis area    Neutral flossing R LE    Ankle joint mobs R STM bilateral QL's and incisional area/R piriformis area    Neutral flossing R LE    Ankle joint mobs R STM bilateral QL's and incisional area/R piriformis area   STM bilateral QL's and incisional area/R piriformis area    Neutral flossing R LE    Ankle joint mobs R   Therapeutic Exercise Grape/ant - several angles of knee flexion    Abdominal isometrics  1. Center  2. Diagonals         DKTC    Lumbar flexion in sitting     DKTC    Lumbar flexion in sitting 1/2 table top    Full table top    Abdominal isometrics  1. Center  2.   Diagonals    Therapeutic Activity        Neuromuscular Education    Standing wall supported IO/TA    Standing supported Problem: Falls - Risk of:  Goal: Will remain free from falls  Description: Will remain free from falls  6/22/2020 1255 by Jennifer Davies RN  Outcome: Ongoing     Problem: Falls - Risk of:  Goal: Absence of physical injury  Description: Absence of physical injury  6/22/2020 1255 by Jennifer Davies RN  Outcome: Ongoing     Problem: Pain:  Goal: Pain level will decrease  Description: Pain level will decrease  6/22/2020 1255 by Jennifer Davies RN  Outcome: Ongoing     Problem: Pain:  Goal: Control of acute pain  Description: Control of acute pain  6/22/2020 1255 by Jennifer Davies RN  Outcome: Ongoing wall reach     TNE Education        HEP Grape/ant - several angles of knee flexion DKTC    Lumbar flexion in sitting DKTC    Lumbar flexion in sitting Standing wall supported IO/TA    Full table top    Abdominal isometrics  1. Center  2. Diagonals        Assessment: No adverse effects to treatment. Continued to focus on manual therapy to decrease symptoms. The pt did report some pain relief after the session and was given an updated HEP. Goals: The pt was educated on the plan of care, purpose and individual goals for therapy, precautions for therapy. All questions were answered. 1.  The pt will be independent in their HEP. MET 2/17/2023  2. Centralization of symptoms to the lumbar spine. PROGRESSING 2/17/2023  3. The pt will be independent in a modified workout program.  PROGRESSING 2/17/2023  4. The pt will report 75% improvement overall. MET 2/17/2023  5. The pt will be able to walk 2 miles without an increase in pain. MET 2/17/2023  6. The pt will display probably body mechanics when bending over to pick an item off the floor. MET 2/17/2023    Frequency / Duration: Patient will be seen for 1-2 x/week or a total of 8 visits over a 90 day period. Treatment will include: Manual Therapy; Therapeutic Exercises; Neuromuscular Re-education; Therapeutic Activity; Patient education: Home exercise program instruction; TNE Education; Modalities as needed. Education or treatment limitation: None  Rehab Potential good    Charges: Man3(38)   Total Timed Treatment: 38 min  Total Treatment Time: 38 min    Patient was advised of these findings, precautions, and treatment options and has agreed to actively participate in planning and for this course of care. Thank you for your referral. Please co-sign or sign and return this letter via fax as soon as possible to [phone]. If you have any questions, please contact me at Dept: [phone].     Sincerely,  Eunice Mills PT    Electronically signed by therapist: Meera Harvey PT    [de-identified] certification required: Yes  I certify the need for these services furnished under this plan of treatment and while under my care.     X___________________________________________________ Date____________________    Certification From: 1/01/0821  To:5/20/2023

## 2024-07-11 ENCOUNTER — OFFICE VISIT (OUTPATIENT)
Dept: ORTHOPEDIC SURGERY | Age: 89
End: 2024-07-11
Payer: OTHER GOVERNMENT

## 2024-07-11 VITALS — RESPIRATION RATE: 15 BRPM | OXYGEN SATURATION: 97 % | WEIGHT: 136 LBS | HEIGHT: 65 IN | BODY MASS INDEX: 22.66 KG/M2

## 2024-07-11 DIAGNOSIS — S93.409A SPRAIN OF LIGAMENT OF ANKLE, INITIAL ENCOUNTER: Primary | ICD-10-CM

## 2024-07-11 DIAGNOSIS — M79.671 RIGHT FOOT PAIN: Primary | ICD-10-CM

## 2024-07-11 PROCEDURE — 99214 OFFICE O/P EST MOD 30 MIN: CPT | Performed by: ORTHOPAEDIC SURGERY

## 2024-07-11 PROCEDURE — 1123F ACP DISCUSS/DSCN MKR DOCD: CPT | Performed by: ORTHOPAEDIC SURGERY

## 2024-07-11 NOTE — PROGRESS NOTES
new imaging/labs results, we may consider the following options:    []  Orthotic (OTC)     []  Orthotic (custom)          []  Rocker bottom shoes     []  Brace (OTC)        []  Brace (custom)             []  CAM boot        []  Night splint         []  Heel cups        []  Strap      []  Toe sleeves/splints    []  PT:                     [x]  Wean out of immobilization   [x]  Advance activity       []  Topical               []  NSAIDs          []  Vanda         []  Referral:         []  Stress xrays       []  CT         []  MRI        []  Injection:         []  Consider OR      []  Pick OR date    No follow-ups on file.    No orders of the defined types were placed in this encounter.    No orders of the defined types were placed in this encounter.        Junior Damian MD  Orthopedic Surgery        Please excuse any typos/errors, as this note was created with the assistance of voice recognition software. While intending to generate a document that actually reflects the content of the visit, the document can still have some errors including those of syntax and sound-a-like substitutions which may escape proof reading. In such instances, actual meaning can be extrapolated by context.

## 2024-12-04 DIAGNOSIS — M79.671 RIGHT FOOT PAIN: ICD-10-CM

## 2024-12-04 DIAGNOSIS — S93.409A SPRAIN OF LIGAMENT OF ANKLE, INITIAL ENCOUNTER: Primary | ICD-10-CM

## 2024-12-05 ENCOUNTER — OFFICE VISIT (OUTPATIENT)
Dept: ORTHOPEDIC SURGERY | Age: 89
End: 2024-12-05
Payer: MEDICARE

## 2024-12-05 VITALS — WEIGHT: 141 LBS | RESPIRATION RATE: 16 BRPM | BODY MASS INDEX: 23.49 KG/M2 | OXYGEN SATURATION: 100 % | HEIGHT: 65 IN

## 2024-12-05 DIAGNOSIS — G89.29 CHRONIC PAIN OF LEFT ANKLE: ICD-10-CM

## 2024-12-05 DIAGNOSIS — M54.32 SCIATICA OF LEFT SIDE: Primary | ICD-10-CM

## 2024-12-05 DIAGNOSIS — M79.672 LEFT FOOT PAIN: Primary | ICD-10-CM

## 2024-12-05 DIAGNOSIS — M25.572 CHRONIC PAIN OF LEFT ANKLE: ICD-10-CM

## 2024-12-05 PROCEDURE — 1090F PRES/ABSN URINE INCON ASSESS: CPT

## 2024-12-05 PROCEDURE — 1036F TOBACCO NON-USER: CPT

## 2024-12-05 PROCEDURE — 1125F AMNT PAIN NOTED PAIN PRSNT: CPT

## 2024-12-05 PROCEDURE — G8420 CALC BMI NORM PARAMETERS: HCPCS

## 2024-12-05 PROCEDURE — 99214 OFFICE O/P EST MOD 30 MIN: CPT

## 2024-12-05 PROCEDURE — 1123F ACP DISCUSS/DSCN MKR DOCD: CPT

## 2024-12-05 PROCEDURE — G8484 FLU IMMUNIZE NO ADMIN: HCPCS

## 2024-12-05 PROCEDURE — 1159F MED LIST DOCD IN RCRD: CPT

## 2024-12-05 PROCEDURE — G8427 DOCREV CUR MEDS BY ELIG CLIN: HCPCS

## 2024-12-05 NOTE — PATIENT INSTRUCTIONS
PATIENTIQ:  PatientIQ helps [facility] stay in touch with you to know how you're feeling, and provides education and care instructions to you at various time points.   Your answers help your care team track your progress to provide the best care possible. PatientIQ will contact you pre-op and post-op via email or text with:  Educational Videos and Care Instructions  Questionnaires About How You're Feeling    Your participation provides you valuable education and helps [facility] continue to provide quality care to all patients. Thank you

## 2024-12-06 ASSESSMENT — ENCOUNTER SYMPTOMS
COLOR CHANGE: 0
BACK PAIN: 1

## 2024-12-06 NOTE — PROGRESS NOTES
[facility] ORTHOPEDICS AND SPORTS MEDICINE  [address]  [place] [address]  Dept: [phone]  Dept Fax: [phone]        Ambulatory Follow Up      Subjective:   Nicole Scott is a 89 y.o. year old female who presents to our office today for routine followup regarding her   1. Sciatica of left side    2. Chronic pain of left ankle    .    Chief Complaint   Patient presents with    Foot Pain     Left     Ankle Pain     Left        History of Present Illness  The patient is an 89-year-old female who presents for evaluation of left ankle and foot pain.    She underwent a left ankle ORIF on 06/23/2020, which was successful. She was nonweightbearing for a year post-surgery. She also completed PT for the foot and ankle as recommended. Currently, she is experiencing difficulty walking due to a burning pain in her leg, which has been ongoing for the past 5 months. She sought help from urgent care 3 weeks ago and received a steroid injection in her buttocks, which provided some relief. Her pain intensifies when she stands up or walks, and certain movements exacerbate the discomfort. Sitting down does not alleviate the pain, and leaning over sometimes worsens it. She does not use a cane or walker for mobility. She wears tennis shoes, even around the house, and does not use any special inserts. She has been doing stretching exercises for her ankle and toes. She does not use Voltaren gel or any topical medicine due to her psoriasis. She has not had any spine x-rays. She does not experience any numbness or tingling in her foot.     She did have some issues with piriformis and had therapy on that about 5 months ago, which helped her. She thinks the way she is walking is causing issues with the whole left side. She does not have any history of neuropathy.    She has psoriasis and was diagnosed about 1.5 years ago. It was caused by

## 2024-12-17 ENCOUNTER — APPOINTMENT (OUTPATIENT)
Dept: PHYSICAL THERAPY | Facility: CLINIC | Age: 89
End: 2024-12-17
Payer: MEDICARE

## 2024-12-30 ENCOUNTER — HOSPITAL ENCOUNTER (OUTPATIENT)
Dept: PHYSICAL THERAPY | Facility: CLINIC | Age: 89
Setting detail: THERAPIES SERIES
Discharge: HOME | End: 2024-12-30
Payer: MEDICARE

## 2024-12-30 PROCEDURE — 97110 THERAPEUTIC EXERCISES: CPT

## 2024-12-30 PROCEDURE — 97161 PT EVAL LOW COMPLEX 20 MIN: CPT

## 2024-12-30 NOTE — CONSULTS
[] [facility]  Outpatient Rehabilitation &  Therapy  [address]  [phone]  F: [phone] [] [facility]  Outpatient Rehabilitation &  Therapy  [address]   [address]  P: [phone]  F: [phone] [] [facility] [place]  Outpatient Rehabilitation &  Therapy  [address]  P: [phone]  F: [phone] [] [facility] [place]  Outpatient Rehabilitation &  Therapy  [address]  P: [phone]  F: [phone] [x] [facility] [place]  Outpatient Rehabilitation &  Therapy  [address]   [address]   P: [phone]  F: [phone]        Physical Therapy Spine Evaluation    Date:  2024  Patient: Nicole Scott  : 1935  MRN: [id number]  Physician: DOROTA Anderson    Insurance: [facility] Medicare (Auth after eval)  Medical Diagnosis: Sciatica of left side (M54.32)    Rehab Codes: M62.81 , R26.89 , M54.5 , M25.651, M25.652   Onset Date: referral date 24  Next 's appt.: 25 with pain management       Subjective:   CC/HPI: Patient is a 89 y/o female presenting to PT clinic with complaints of left sided hip and leg that has been getting progressively worse over the last 5 months. She reports that her pain started about 4 years ago after an ankle fracture. She was NWB for a year. She was in a wheelchair, had a fall out of the wheelchair landing on her back at that time. She reports pain into her leg, but also experiences left sided shoulder and neck pain as well. Notes that she can tell she isn't walking normally. She has been doing exercises provided to her by DOROTA Anderson, and these have not helped with her pain levels. Likely unrelated, she is dealing with increased psoriatic pain.      PMHx:   [] Unremarkable               [x] Refer to full medical chart  In EPIC     Past Medical History         Diagnosis Date Comments     Hypothyroidism [E03.9]       GERD (gastroesophageal reflux disease)

## 2025-01-06 ENCOUNTER — HOSPITAL ENCOUNTER (OUTPATIENT)
Dept: PHYSICAL THERAPY | Facility: CLINIC | Age: 89
Setting detail: THERAPIES SERIES
Discharge: HOME | End: 2025-01-06

## 2025-01-06 NOTE — FLOWSHEET NOTE
[] [facility]  Outpatient Rehabilitation &  Therapy  [address]  [phone]  [phone] [] [facility]  Outpatient Rehabilitation &  Therapy  [address]  P: [phone]  F: [phone] [] [facility]  Outpatient Rehabilitation &  Therapy  [address]  P: [phone]  F: [phone] [] [facility]  Outpatient Rehabilitation &  Therapy  [address]  [phone]  [phone] [x] [facility]  Outpatient Rehabilitation &  Therapy  [address]   P: [phone]  F: [phone]  [] [facility]  Outpatient Rehabilitation &  Therapy  [address]  P: [phone]  F: [phone] [] [facility]  Outpatient Rehabilitation &  Therapy  [address]  P: [phone]  F: [phone] [] [facility]  Outpatient Rehabilitation &  Therapy  [address]  P: [phone]  F: [phone] [] [facility]  Outpatient Rehabilitation &  Therapy  [address]  P: [phone]  F: [phone]  [] [facility] Outpatient Rehabilitation &  Therapy  [address]  P: [phone]  F: [phone]     Therapy Cancel/No Show note    Date: 2025  Patient: Nicole Scott  : 1935  MRN: [id number]    Cancels/No Shows to date:     For today's appointment patient:    [x]  Cancelled    [] Rescheduled appointment    [] No-show     Reason given by patient:    [x]  Patient ill    []  Conflicting appointment    [] No transportation      [] Conflict with work    [] No reason given    [] Weather related    [] COVID-19    [] Other:      Comments:        [] Next appointment was confirmed    Electronically signed by: Kourtney Grewal

## 2025-01-08 ENCOUNTER — APPOINTMENT (OUTPATIENT)
Dept: PHYSICAL THERAPY | Facility: CLINIC | Age: 89
End: 2025-01-08
Payer: MEDICARE

## 2025-01-13 ENCOUNTER — HOSPITAL ENCOUNTER (OUTPATIENT)
Dept: PHYSICAL THERAPY | Facility: CLINIC | Age: 89
Setting detail: THERAPIES SERIES
Discharge: HOME | End: 2025-01-13
Payer: MEDICARE

## 2025-01-13 PROCEDURE — 97110 THERAPEUTIC EXERCISES: CPT

## 2025-01-13 NOTE — FLOWSHEET NOTE
[x] [facility]  Outpatient Rehabilitation &  Therapy  [address]   P: [phone]  F: [phone]      Physical Therapy Daily Treatment Note    Date:  2025  Patient Name:  Nicole Scott    :  1935  MRN: [id number]  Physician: DOROTA Anderson              Insurance: [facility] Medicare Auth# [id number] 24-25 16VS, [facility]   Medical Diagnosis: Sciatica of left side (M54.32)      Rehab Codes: M62.81 , R26.89 , M54.5 , M25.651, M25.652   Onset Date: referral date 24  Next 's appt.: 25 with pain management     Visit# / total visits: ; Progress note for Medicare patient due at visit 10     Cancels/No Shows: 1    Subjective:    Pain:  [x] Yes  [] No Location: LLE  Pain Rating: (0-10 scale) 8/10  Pain altered Tx:  [x] No  [] Yes  Action:  Comments: Patient arrived noting continued burning in calf and stiffness in ankle.    Objective:  recautions: psoriasis, increased left lower leg sensitivity   Exercise       Reps/ Time Weight/ Level Comments             NuStep   5'                 Calf Stretch   3x30\"       Hamstring Stretch Stool  3x30\"     Heel Raises off step   x10                 LTR  5x10\"       Posterior pelvic tilts         Posterior pelvic tilts with march          Hip Flexor Stretch   1'       Piriformis Stretch    3x30\"                 Gluteal sets   10x10\"    HEP   Prone Hip Extension          Clamshells  2x10    HEP   Sidelying hip abd   x10    HEP   Bridges   2x10    HEP              4 way hip  x10  [place]  HEP    Total Gym Squats  2x10  L20      Total Gym Heel Raises  2x10  L20                             Other:      MFR to the left piriformis in prone today         Specific Instructions for next treatment: manual as needed (to piriformis/hip flexor and calf if tolerated)         Treatment Charges: Mins Units Time In/Out   []  Modalities          [x]  Ther Exercise 35 2  3:50pm - 4:25pm   []  Neuromuscular Re-ed      []  Gait Training

## 2025-01-15 ENCOUNTER — HOSPITAL ENCOUNTER (OUTPATIENT)
Dept: PHYSICAL THERAPY | Facility: CLINIC | Age: 89
Setting detail: THERAPIES SERIES
Discharge: HOME | End: 2025-01-15
Payer: MEDICARE

## 2025-01-15 PROCEDURE — 97110 THERAPEUTIC EXERCISES: CPT

## 2025-01-15 NOTE — FLOWSHEET NOTE
Back Pain Questionnaire from 52% impairment to less than 40% impairment  []  []  []      2. Reduce pain levels to 3/10 or less with ADLs []  []  []      3. Patient to demonstrate ability to complete a set of 20 bridges without an increase in LE cramping  []  []  []                        Patient goals: decrease pain     Pt. Education:  [x] Yes  [] No  [x] Reviewed Prior HEP/Ed  Method of Education: [x] Verbal  [] Demo  [x] Written:    Access Code: [email or web address]  URL: [email or web address]  Date: 01/13/2025  Prepared by: Tariq Briscoe    Exercises  - Supine Lower Trunk Rotation  - 2 x daily - 7 x weekly - 3 sets - 10 reps - 10 sec hold  - Supine Piriformis Stretch with Foot on Ground  - 2 x daily - 7 x weekly - 3 sets - 30 sec hold  - Modified Nasim Stretch  - 2 x daily - 7 x weekly - 3 sets - 60 sec hold  - Long Sitting Calf Stretch with Strap  - 2 x daily - 7 x weekly - 3 sets - 30 sec hold  - Long Sitting Soleus Stretch on Bolster with Strap  - 2 x daily - 7 x weekly - 3 sets - 30 sec hold  - Standing Bilateral Heel Raise on Step  - 2 x daily - 7 x weekly - 2 sets - 10 reps  - Hooklying Gluteal Sets  - 1 x daily - 7 x weekly - 3 sets - 10 reps  - Supine Bridge  - 1 x daily - 7 x weekly - 3 sets - 10 reps  - Clamshell  - 1 x daily - 7 x weekly - 3 sets - 10 reps  - Sidelying Hip Abduction  - 1 x daily - 7 x weekly - 3 sets - 10 reps  - Standing Hip Abduction with Anchored Resistance  - 1 x daily - 7 x weekly - 3 sets - 10 reps  - Standing Hip Extension with Anchored Resistance  - 1 x daily - 7 x weekly - 3 sets - 10 reps  - Standing Hip Adduction with Anchored Resistance  - 1 x daily - 7 x weekly - 3 sets - 10 reps  - Standing Repeated Hip Flexion with Resistance  - 1 x daily - 7 x weekly - 3 sets - 10 reps    Comprehension of Education:  [x] Verbalizes understanding.  [] Demonstrates understanding.  [] Needs review.  [x] Demonstrates/verbalizes HEP/Ed previously given.     Plan: [x] Continue current

## 2025-01-22 ENCOUNTER — HOSPITAL ENCOUNTER (OUTPATIENT)
Dept: PHYSICAL THERAPY | Facility: CLINIC | Age: 89
Setting detail: THERAPIES SERIES
Discharge: HOME | End: 2025-01-22
Payer: MEDICARE

## 2025-01-22 PROCEDURE — 97110 THERAPEUTIC EXERCISES: CPT

## 2025-01-22 NOTE — FLOWSHEET NOTE
[x] [facility]  Outpatient Rehabilitation &  Therapy  [address]   P: [phone]  F: [phone]      Physical Therapy Daily Treatment Note    Date:  2025  Patient Name:  Nicole Scott    :  1935  MRN: [id number]  Physician: DOROTA Anderson              Insurance: [facility] Medicare Auth# [id number] 24-25 16VS, [facility]   Medical Diagnosis: Sciatica of left side (M54.32)      Rehab Codes: M62.81 , R26.89 , M54.5 , M25.651, M25.652   Onset Date: referral date 24  Next 's appt.: 25 with pain management   Visit# / total visits: ; Progress note for Medicare patient due at visit 10  Cancels/No Shows: 1    Subjective:    Pain:  [x] Yes  [] No Location: LLE (Hip and lower leg) Pain Rating: (0-10 scale) not rated/10  Pain altered Tx:  [x] No  [] Yes  Action:  Comments: Patient mentions that most of her recent pain has been a burning pain into her left upper back. Does feel improved posture.     Objective:  recautions: psoriasis, increased left lower leg sensitivity   Exercise       Reps/ Time Weight/ Level Comments             NuStep   8'  L3     Bike     Attempted but difficult for the patient    Calf Stretch Slantboard   3x30\"   Reviewed in supine with strap   HEP   Soleus Stretch with strap   3x30\"  Left only  HEP   Hamstring Stretch Stool  3x30\"     Heel Raises off step                   LTR  3x10\"       Posterior pelvic tilts  10x5\"       Posterior pelvic tilts with march   x10       Posterior pelvic tilts with bent knee fall outs   x10     Hip Flexor Stretch   HEP       Piriformis Stretch    HEP                 Gluteal sets   10x10\"    HEP   Prone Hip Extension          Clamshells  2x10  [place]  HEP   Supine Clamshells   x20  [place]      Supine hip abd   x10  A  HEP   Bridges   2x10    HEP              3-way hip  x10  [place]  HEP- with small loop for HEP     Total Gym Squats  2x10  L20      Total Gym Heel Raises  2x10  L20

## 2025-01-24 ENCOUNTER — HOSPITAL ENCOUNTER (OUTPATIENT)
Dept: PHYSICAL THERAPY | Facility: CLINIC | Age: 89
Setting detail: THERAPIES SERIES
Discharge: HOME | End: 2025-01-24
Payer: MEDICARE

## 2025-01-24 PROCEDURE — 97110 THERAPEUTIC EXERCISES: CPT

## 2025-01-24 NOTE — FLOWSHEET NOTE
[x] [facility]  Outpatient Rehabilitation &  Therapy  [address]   P: [phone]  F: [phone]      Physical Therapy Daily Treatment Note    Date:  2025  Patient Name:  Nicole Scott    :  1935  MRN: [id number]  Physician: DOROTA Anderson              Insurance: [facility] Medicare Auth# [id number] 24-25 16VS, [facility]   Medical Diagnosis: Sciatica of left side (M54.32)      Rehab Codes: M62.81 , R26.89 , M54.5 , M25.651, M25.652   Onset Date: referral date 24  Next 's appt.: 25 with pain management   Visit# / total visits: ; Progress note for Medicare patient due at visit 10  Cancels/No Shows: 1    Subjective:    Pain:  [x] Yes  [] No Location: LLE (Hip and lower leg) Pain Rating: (0-10 scale) not rated/10  Pain altered Tx:  [x] No  [] Yes  Action:  Comments: Patient arrives reporting some hip soreness after last session. Minimal pain on arrival.     Objective:  recautions: psoriasis, increased left lower leg sensitivity   Exercise       Reps/ Time Weight/ Level Comments             NuStep   10'  L3     Bike     Attempted but difficult for the patient    Calf Stretch Slantboard   3x30\"   Reviewed in supine with strap   HEP   Soleus Stretch with strap   3x30\"  Left only  HEP   Hamstring Stretch Stool  3x30\"     Heel Raises off step                   LTR HEP       Posterior pelvic tilts         Posterior pelvic tilts with march         Posterior pelvic tilts with bent knee fall outs       Hip Flexor Stretch   1'       Piriformis Stretch    HEP                      HEP   Prone Hip Extension          Clamshells HEP  [place]  HEP   Supine Clamshells  HEP  [place]      Sidelying hip abd   x10  A  HEP   Bridges   2x10    HEP              4-way hip  x10  [place]  with small loop for HEP     Total Gym Squats  2x10  L20      Total Gym Heel Raises  2x10  L20      Lunges at mat table   2x10                       Other:      Specific Instructions for next

## 2025-01-27 ENCOUNTER — HOSPITAL ENCOUNTER (OUTPATIENT)
Dept: PHYSICAL THERAPY | Facility: CLINIC | Age: 89
Setting detail: THERAPIES SERIES
Discharge: HOME | End: 2025-01-27
Payer: MEDICARE

## 2025-01-27 PROCEDURE — 97140 MANUAL THERAPY 1/> REGIONS: CPT

## 2025-01-27 PROCEDURE — 97110 THERAPEUTIC EXERCISES: CPT

## 2025-01-27 NOTE — FLOWSHEET NOTE
[x] [facility]  Outpatient Rehabilitation &  Therapy  [address]   P: [phone]  F: [phone]      Physical Therapy Daily Treatment Note    Date:  2025  Patient Name:  Nicole Scott    :  1935  MRN: [id number]  Physician: DOROTA Anderson              Insurance: [facility] Medicare Auth# [id number] 24-25 16VS, [facility]   Medical Diagnosis: Sciatica of left side (M54.32)      Rehab Codes: M62.81 , R26.89 , M54.5 , M25.651, M25.652   Onset Date: referral date 24  Next 's appt.: 25 with pain management   Visit# / total visits: ; Progress note for Medicare patient due at visit 10  Cancels/No Shows: 1    Subjective:    Pain:  [x] Yes  [] No Location: LLE (Hip and lower leg) Pain Rating: (0-10 scale) not rated/10  Pain altered Tx:  [x] No  [] Yes  Action:  Comments: Patient arrives reporting increased left sided hip soreness. Wondering if she is overdoing it with her exercises at home.     Objective:  recautions: psoriasis, increased left lower leg sensitivity   Exercise       Reps/ Time Weight/ Level Comments             NuStep   5'  L3     Bike     Attempted but difficult for the patient    Calf Stretch Slantboard   3x30\"   Reviewed in supine with strap   HEP   Soleus Stretch with strap   3x30\"  Left only  HEP   Hamstring Stretch Stool  3x30\"     Heel Raises off step                   LTR HEP       Posterior pelvic tilts         Posterior pelvic tilts with march  x10       Posterior pelvic tilts with bent knee fall outs       Posterior pelvic tilts with alternating arm raises    x10     Hip Flexor Stretch   1'       Piriformis Stretch    3x30\"                      HEP   Prone Hip Extension          Clamshells x10  [place]  HEP   Supine Clamshells  x20  [place]      Sidelying hip abd     A  HEP   Bridges   x10    HEP              4-way hip    [place]  with small loop for HEP     Total Gym Squats  2x10  L20      Total Gym Heel Raises  2x10  L20      Lunges

## 2025-01-29 ENCOUNTER — HOSPITAL ENCOUNTER (OUTPATIENT)
Dept: PHYSICAL THERAPY | Facility: CLINIC | Age: 89
Setting detail: THERAPIES SERIES
Discharge: HOME | End: 2025-01-29
Payer: MEDICARE

## 2025-01-29 PROCEDURE — 97110 THERAPEUTIC EXERCISES: CPT

## 2025-01-29 NOTE — FLOWSHEET NOTE
ability to complete a set of 20 bridges without an increase in LE cramping  []  []  []                        Patient goals: decrease pain     Pt. Education:  [x] Yes  [] No  [x] Reviewed Prior HEP/Ed  Method of Education: [x] Verbal  [] Demo  [x] Written:    Access Code: [email or web address]  URL: [email or web address]  Date: 01/22/2025  Prepared by: Krissy Guerra    Exercises  - Hooklying Clamshell with Resistance  - 1 x daily - 7 x weekly - 3 sets - 10 reps  - Supine Single Leg Hip Abduction with Alternating Legs and Resistance at Ankles  - 1 x daily - 7 x weekly - 3 sets - 10 reps  - Hip Abduction with Resistance Loop  - 1 x daily - 7 x weekly - 2 sets - 10 reps  - Hip Extension with Resistance Loop  - 1 x daily - 7 x weekly - 2 sets - 10 reps  - Standing Hip Flexion with Resistance Loop  - 1 x daily - 7 x weekly - 2 sets - 10 reps  - Supine March with Posterior Pelvic Tilt  - 1 x daily - 7 x weekly - 3 sets - 10 reps  - Pelvic Tilt with bent knee fallouts   - 1 x daily - 7 x weekly - 3 sets - 10 reps    Access Code: [email or web address]  URL: [email or web address]  Date: 01/13/2025  Prepared by: Tariq Briscoe    Exercises  - Supine Lower Trunk Rotation  - 2 x daily - 7 x weekly - 3 sets - 10 reps - 10 sec hold  - Supine Piriformis Stretch with Foot on Ground  - 2 x daily - 7 x weekly - 3 sets - 30 sec hold  - Modified Nasim Stretch  - 2 x daily - 7 x weekly - 3 sets - 60 sec hold  - Long Sitting Calf Stretch with Strap  - 2 x daily - 7 x weekly - 3 sets - 30 sec hold  - Long Sitting Soleus Stretch on Bolster with Strap  - 2 x daily - 7 x weekly - 3 sets - 30 sec hold  - Standing Bilateral Heel Raise on Step  - 2 x daily - 7 x weekly - 2 sets - 10 reps  - Hooklying Gluteal Sets  - 1 x daily - 7 x weekly - 3 sets - 10 reps  - Supine Bridge  - 1 x daily - 7 x weekly - 3 sets - 10 reps  - Clamshell  - 1 x daily - 7 x weekly - 3 sets - 10 reps  - Sidelying Hip Abduction  - 1 x daily - 7 x weekly - 3 sets - 10

## 2025-02-03 ENCOUNTER — HOSPITAL ENCOUNTER (OUTPATIENT)
Dept: PHYSICAL THERAPY | Facility: CLINIC | Age: 89
Setting detail: THERAPIES SERIES
Discharge: HOME | End: 2025-02-03
Payer: MEDICARE

## 2025-02-03 PROCEDURE — 97110 THERAPEUTIC EXERCISES: CPT

## 2025-02-03 NOTE — FLOWSHEET NOTE
[x] [facility]  Outpatient Rehabilitation &  Therapy  [address]   P: [phone]  F: [phone]      Physical Therapy Daily Treatment Note    Date:  2/3/2025  Patient Name:  Nicole Scott    :  1935  MRN: [id number]  Physician: DOROTA Anderson              Insurance: [facility] Medicare Auth# [id number] 24-25 16VS, [facility]   Medical Diagnosis: Sciatica of left side (M54.32)      Rehab Codes: M62.81 , R26.89 , M54.5 , M25.651, M25.652   Onset Date: referral date 24  Next 's appt.: 25 with pain management   Visit# / total visits: ; Progress note for Medicare patient due at visit 10  Cancels/No Shows: 1    Subjective:    Pain:  [] Yes  [x] No Location: LLE (Hip and lower leg) Pain Rating: (0-10 scale) 0/10  Pain altered Tx:  [x] No  [] Yes  Action:  Comments: Patient arrives reporting increased left ankle stiffness. Denies left hip/leg pain on entrance.     Objective:  recautions: psoriasis, increased left lower leg sensitivity   Exercise       Reps/ Time Weight/ Level Comments             NuStep   5'  L3           Heel Raises off step  NT                 Posterior pelvic tilts with march  x10       Posterior pelvic tilts with bent knee fall outs  x10     Posterior pelvic tilts with alternating arm raises    x10     Hip Flexor Stretch at step today  3x30\"    to address both hip flexor and ankle mobility    Piriformis Stretch   3x30\"       Open books  X10 ea      Clamshells 2x10 [place]  HEP   Supine clamshells  HEP [place]    Sidelying hip abd  PAIN  Active    Bridges - toes up  x20      Supine hip abduction HEP [place]          Parallel Bars      SB gastroc stretch   3x30\"     Soleus stretch    Addressed with hip flexor stretch at step    HS stool stretch  3x30\"     Lunges  PAIN     4-way hip  x10 [place]   using small loop for HEP    Resisted TKE 10x10\"     Sit to stands Squats  2x10      Total Gym Heel Raises   L20                            Specific

## 2025-02-05 ENCOUNTER — HOSPITAL ENCOUNTER (OUTPATIENT)
Dept: PHYSICAL THERAPY | Facility: CLINIC | Age: 89
Setting detail: THERAPIES SERIES
Discharge: HOME | End: 2025-02-05
Payer: MEDICARE

## 2025-02-05 PROCEDURE — 97110 THERAPEUTIC EXERCISES: CPT

## 2025-02-05 NOTE — FLOWSHEET NOTE
[x] [facility]  Outpatient Rehabilitation &  Therapy  [address]   P: [phone]  F: [phone]      Physical Therapy Daily Treatment Note    Date:  2025  Patient Name:  Nicole Scott    :  1935  MRN: [id number]  Physician: DOROTA Anderson              Insurance: [facility] Medicare Auth# [id number] 24-25 16VS, [facility]   Medical Diagnosis: Sciatica of left side (M54.32)      Rehab Codes: M62.81 , R26.89 , M54.5 , M25.651, M25.652   Onset Date: referral date 24  Next 's appt.: 25 with pain management   Visit# / total visits: ; Progress note for Medicare patient due at visit 10  Cancels/No Shows: 1    Subjective:    Pain:  [x] Yes  [] No Location: LLE (Hip and lower leg) Pain Rating: (0-10 scale) 5/10  Pain altered Tx:  [x] No  [] Yes  Action:  Comments: Patient arrives stating continued stiffness and pain in ankle. 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 Overall her pain is better and does not have burning in the back of her calf muscles. Soreness in abdominals from plevic tilting. Has already performed 4-way hip, pelvic tilts and supine marches.     Objective:  recautions: psoriasis, increased left lower leg sensitivity   Exercise       Reps/ Time Weight/ Level Comments             NuStep   10'  L3               Posterior pelvic tilts with march  x10   HEP   Posterior pelvic tilts with alternating arm raises    x10     Piriformis Stretch   3x30\"       Open books  x10 ea      Clamshells 2x10 [place]  HEP   Bridges  x20      Hip abduction  x10 Active           Parallel Bars      SB gastroc stretch   3x30\"     Soleus stretch    Addressed with hip flexor stretch at step    Hip flexor stretch at step      HS stool stretch  3x30\"     Lunges  PAIN     4-way hip  x10 [place]   using small loop for HEP    Resisted TKE 10x10\"     Sit to stands Squats  2x10  L20     Total Gym Heel Raises  2x10

## 2025-02-10 ENCOUNTER — APPOINTMENT (OUTPATIENT)
Dept: PHYSICAL THERAPY | Facility: CLINIC | Age: 89
End: 2025-02-10
Payer: MEDICARE

## 2025-02-12 ENCOUNTER — HOSPITAL ENCOUNTER (OUTPATIENT)
Dept: PHYSICAL THERAPY | Facility: CLINIC | Age: 89
Setting detail: THERAPIES SERIES
Discharge: HOME | End: 2025-02-12
Payer: MEDICARE

## 2025-02-12 NOTE — FLOWSHEET NOTE
[] [facility]  Outpatient Rehabilitation &  Therapy  [address]  [phone]  [phone] [] [facility]  Outpatient Rehabilitation &  Therapy  [address]  P: [phone]  F: [phone] [] [facility]  Outpatient Rehabilitation &  Therapy  [address]  P: [phone]  F: [phone] [] [facility]  Outpatient Rehabilitation &  Therapy  [address]  [phone]  [phone] [x] [facility]  Outpatient Rehabilitation &  Therapy  [address]   P: [phone]  F: [phone]  [] [facility]  Outpatient Rehabilitation &  Therapy  [address]  P: [phone]  F: [phone] [] [facility]  Outpatient Rehabilitation &  Therapy  [address]  P: [phone]  F: [phone] [] [facility]  Outpatient Rehabilitation &  Therapy  [address]  P: [phone]  F: [phone] [] [facility]  Outpatient Rehabilitation &  Therapy  [address]  P: [phone]  F: [phone]  [] [facility] Outpatient Rehabilitation &  Therapy  [address]  P: [phone]  F: [phone]     Therapy Cancel/No Show note    Date: 2025  Patient: Nicole Scott  : 1935  MRN: [id number]    Cancels/No Shows to date: 0    For today's appointment patient:    [x]  Cancelled    [] Rescheduled appointment    [] No-show     Reason given by patient:    []  Patient ill    []  Conflicting appointment    [] No transportation      [] Conflict with work    [] No reason given    [] Weather related    [] COVID-19    [] Other:      Comments:        [x] Next appointment was confirmed    Electronically signed by: Kourtney Grewal

## 2025-02-13 ENCOUNTER — APPOINTMENT (OUTPATIENT)
Dept: PHYSICAL THERAPY | Facility: CLINIC | Age: 89
End: 2025-02-13
Payer: MEDICARE

## 2025-02-14 ENCOUNTER — HOSPITAL ENCOUNTER (OUTPATIENT)
Dept: PHYSICAL THERAPY | Facility: CLINIC | Age: 89
Setting detail: THERAPIES SERIES
Discharge: HOME | End: 2025-02-14
Payer: MEDICARE

## 2025-02-14 NOTE — FLOWSHEET NOTE
[] [facility]  Outpatient Rehabilitation &  Therapy  [address]  [phone]  [phone] [] [facility]  Outpatient Rehabilitation &  Therapy  [address]  P: [phone]  F: [phone] [] [facility]  Outpatient Rehabilitation &  Therapy  [address]  P: [phone]  F: [phone] [] [facility]  Outpatient Rehabilitation &  Therapy  [address]  [phone]  [phone] [x] [facility]  Outpatient Rehabilitation &  Therapy  [address]   P: [phone]  F: [phone]  [] [facility]  Outpatient Rehabilitation &  Therapy  [address]  P: [phone]  F: [phone] [] [facility]  Outpatient Rehabilitation &  Therapy  [address]  P: [phone]  F: [phone] [] [facility]  Outpatient Rehabilitation &  Therapy  [address]  P: [phone]  F: [phone] [] [facility]  Outpatient Rehabilitation &  Therapy  [address]  P: [phone]  F: [phone]  [] [facility] Outpatient Rehabilitation &  Therapy  [address]  P: [phone]  F: [phone]     Therapy Cancel/No Show note    Date: 2025  Patient: Nicole Scott  : 1935  MRN: [id number]    Cancels/No Shows to date: 0    For today's appointment patient:    [x]  Cancelled    [] Rescheduled appointment    [] No-show     Reason given by patient:    []  Patient ill    []  Conflicting appointment    [] No transportation      [] Conflict with work    [] No reason given    [] Weather related    [] COVID-19    [] Other:      Comments:  Patient had biopsy done on leg earlier in the week and does not feel comfortable coming to PT. Explained to patient insurance only approved visits through 25 and may not give us more

## 2025-02-18 ENCOUNTER — HOSPITAL ENCOUNTER (OUTPATIENT)
Dept: PHYSICAL THERAPY | Facility: CLINIC | Age: 89
Setting detail: THERAPIES SERIES
Discharge: HOME | End: 2025-02-18
Payer: MEDICARE

## 2025-02-18 PROCEDURE — 97110 THERAPEUTIC EXERCISES: CPT

## 2025-02-18 NOTE — FLOWSHEET NOTE
[x] [facility]  Outpatient Rehabilitation &  Therapy  [address]   P: [phone]  F: [phone]      Physical Therapy Daily Treatment Note    Date:  2025  Patient Name:  Nicole Scott    :  1935  MRN: [id number]  Physician: DOROTA Anderson              Insurance: [facility] Medicare Auth# [id number] 16VS, GE   Medical Diagnosis: Sciatica of left side (M54.32)      Rehab Codes: M62.81 , R26.89 , M54.5 , M25.651, M25.652   Onset Date: referral date 24  Next 's appt.: 25 with pain management   Visit# / total visits: 10/16; Progress note for Medicare patient due at visit 10  Cancels/No Shows: 3    Subjective:    Pain:  [] Yes  [x] No Location: N/A  Pain Rating: (0-10 scale) 0/10  Pain altered Tx:  [x] No  [] Yes  Action:  Comments: Patient has had to cancel her last few appointments as she had several biopsies done on her legs for her psoriasis.    Objective:  recautions: psoriasis, increased left lower leg sensitivity   Exercise       Reps/ Time Weight/ Level Comments             NuStep   10'  L3               Posterior pelvic tilts with march      HEP   Posterior pelvic tilts with alternating arm raises         Piriformis Stretch   3x30\"       Open books       Clamshells 2x10 [place]  HEP   Bridges  x20      Hip abduction  2x10 Active           Parallel Bars      SB gastroc stretch        Soleus stretch    Addressed with hip flexor stretch at step    Hip flexor stretch at step 3x30\"     HS stool stretch  3x30\"     Lunges       4-way hip  x10 [place]   using small loop for HEP    Resisted TKE      Sit to stands Squats   L20     Total Gym Heel Raises   L20                         Specific Instructions for next treatment:  discharge to [facility]       Treatment Charges: Mins Units   [x]  Ther Exercise 35 2   []  Neuromuscular Re-ed     []  Gait Training     []  Manual Therapy     []  Ther Activities     []  Vasocompression     []  Other     Total Billable

## 2025-02-24 ENCOUNTER — HOSPITAL ENCOUNTER (OUTPATIENT)
Dept: PHYSICAL THERAPY | Facility: CLINIC | Age: 89
Setting detail: THERAPIES SERIES
Discharge: HOME | End: 2025-02-24
Payer: MEDICARE

## 2025-02-24 NOTE — FLOWSHEET NOTE
[] [facility]  Outpatient Rehabilitation &  Therapy  [address]  [phone]  [phone] [] [facility]  Outpatient Rehabilitation &  Therapy  [address]  P: [phone]  F: [phone] [] [facility]  Outpatient Rehabilitation &  Therapy  [address]  P: [phone]  F: [phone] [] [facility]  Outpatient Rehabilitation &  Therapy  [address]  [phone]  [phone] [x] [facility]  Outpatient Rehabilitation &  Therapy  [address]   P: [phone]  F: [phone]  [] [facility]  Outpatient Rehabilitation &  Therapy  [address]  P: [phone]  F: [phone] [] [facility]  Outpatient Rehabilitation &  Therapy  [address]  P: [phone]  F: [phone] [] [facility]  Outpatient Rehabilitation &  Therapy  [address]  P: [phone]  F: [phone] [] [facility]  Outpatient Rehabilitation &  Therapy  [address]  P: [phone]  F: [phone]  [] [facility] Outpatient Rehabilitation &  Therapy  [address]  P: [phone]  F: [phone]     Therapy Cancel/No Show note    Date: 2025  Patient: Nicole Scott  : 1935  MRN: [id number]    Cancels/No Shows to date: 3/0    For today's appointment patient:    [x]  Cancelled    [] Rescheduled appointment    [] No-show     Reason given by patient:    [x]  Patient ill    []  Conflicting appointment    [] No transportation      [] Conflict with work    [] No reason given    [] Weather related    [] COVID-19    [] Other:      Comments:        [] Next appointment was confirmed    Electronically signed by: FERDINAND SPIVEY, PTA

## 2025-06-19 ENCOUNTER — OFFICE VISIT (OUTPATIENT)
Age: 89
End: 2025-06-19

## 2025-06-19 VITALS
RESPIRATION RATE: 16 BRPM | HEIGHT: 64 IN | BODY MASS INDEX: 24.24 KG/M2 | WEIGHT: 142 LBS | SYSTOLIC BLOOD PRESSURE: 142 MMHG | TEMPERATURE: 99.3 F | DIASTOLIC BLOOD PRESSURE: 82 MMHG | HEART RATE: 90 BPM | OXYGEN SATURATION: 92 %

## 2025-06-19 DIAGNOSIS — E86.0 DEHYDRATION: ICD-10-CM

## 2025-06-19 DIAGNOSIS — R53.1 GENERALIZED WEAKNESS: ICD-10-CM

## 2025-06-19 DIAGNOSIS — R42 DIZZINESS, NONSPECIFIC: ICD-10-CM

## 2025-06-19 DIAGNOSIS — J20.9 ACUTE BRONCHITIS, UNSPECIFIED ORGANISM: Primary | ICD-10-CM

## 2025-06-19 LAB
BILIRUBIN, POC: NORMAL
BLOOD URINE, POC: NORMAL
CLARITY, POC: NORMAL
COLOR, POC: YELLOW
GLUCOSE URINE, POC: NORMAL MG/DL
KETONES, POC: 2 MG/DL
LEUKOCYTE EST, POC: NORMAL
NITRITE, POC: NORMAL
PH, POC: 5.5
PROTEIN, POC: 100 MG/DL
SPECIFIC GRAVITY, POC: 1.03
UROBILINOGEN, POC: 1 MG/DL

## 2025-06-19 RX ORDER — UPADACITINIB 15 MG/1
15 TABLET, EXTENDED RELEASE ORAL DAILY
COMMUNITY

## 2025-06-19 RX ORDER — POTASSIUM CHLORIDE 1500 MG/1
TABLET, EXTENDED RELEASE ORAL
COMMUNITY
Start: 2025-05-21

## 2025-06-19 RX ORDER — AMLODIPINE BESYLATE 5 MG/1
5 TABLET ORAL DAILY
COMMUNITY
Start: 2025-05-28

## 2025-06-19 ASSESSMENT — ENCOUNTER SYMPTOMS
SHORTNESS OF BREATH: 1
VOMITING: 0
ABDOMINAL PAIN: 0
CONSTIPATION: 0
TROUBLE SWALLOWING: 0
EYE REDNESS: 0
COLOR CHANGE: 0
EYE PAIN: 0
BACK PAIN: 0
CHEST TIGHTNESS: 1
DIARRHEA: 0
COUGH: 1
SINUS PRESSURE: 1
SORE THROAT: 1
NAUSEA: 0
VOICE CHANGE: 1

## 2025-06-19 NOTE — PROGRESS NOTES
[facility] [place] [address]  Phone: [phone]  Fax: [phone]      Nicole Scott  1935  MRN: [id number]  Date of visit: 6/19/2025      Chief complaint(s): Dizziness, Chills, Cough, and Urinary Tract Infection (FREQ , URGENCY , GOING WITH COUGH )      History of present illness :     Nicole Scott  is a  very pleasant  89 y.o. female  patient presented to the urgent care today with family for evaluation of cough, dizziness, chills and generalized weakness that has been ongoing for the last week. She has been complaining of non productive cough that has been ongoing over the last week .  She has been trying over-the-counter cold/cough medicine with intermittent brief relief of her symptoms.  Her  symptoms have gotten worse the last 3 days when her cough has become more persistent with frequent coughing spells ,chest tightness, and sharp stabbing chest pain .  She reports intermittent fever, chills ,and body ache with generalized fatigue and low appetite the last few days .  She said that she did not eat or drink for the last 2 days.  She has been trying popsicles but not taking dehydrated.  She has also been complaining of constant dizziness.  She could not specify any precipitating or alleviating factors to her dizziness.  No associated headaches.  No hemoptysis . No palpitations , exertional dyspnea or orthopnea . No earache. No vision changes. No neck stiffness or pain.  No abdominal or back pain.  No nausea or vomiting.  She has been complaining of frequent urination and increased urgency of urination since this morning.  Worse with cough.  No change in bowel movement.  No neurovascular or motor changes in upper or lower extremities.  No rash.  No recent travel.  Denies starting any new medications  .         PAST MEDICAL HISTORY    Past Medical History:   Diagnosis Date    Anemia     Anxiety     Depression     GERD (gastroesophageal  PAST MEDICAL HISTORY:  CAD (coronary artery disease)     Diabetes type 2, controlled, A1c: 8    HLD (hyperlipidemia)     HTN (hypertension)     MI (myocardial infarction)     Sleep apnea, unspecified type     Stented coronary artery      PAST MEDICAL HISTORY:  CAD (coronary artery disease)     Chronic diastolic congestive heart failure     Diabetes type 2, controlled, A1c: 8    HLD (hyperlipidemia)     HTN (hypertension)     MI (myocardial infarction)     Sleep apnea, unspecified type     Stented coronary artery

## (undated) DEVICE — GLOVE SURG SZ 7 CRM LTX FREE POLYISOPRENE POLYMER BEAD ANTI

## (undated) DEVICE — CHLORAPREP 26ML ORANGE

## (undated) DEVICE — C-ARMOR C-ARM EQUIPMENT COVERS CLEAR STERILE UNIVERSAL FIT 12 PER CASE: Brand: C-ARMOR

## (undated) DEVICE — DRESSING PETRO W3XL8IN OIL EMUL N ADH GZ KNIT IMPREG CELOS

## (undated) DEVICE — PAD,ABDOMINAL,5"X9",ST,LF,25/BX: Brand: MEDLINE INDUSTRIES, INC.

## (undated) DEVICE — CANNULATED DRILL

## (undated) DEVICE — HOLDING PIN: Brand: ANCHORAGE

## (undated) DEVICE — GLOVE SURG SZ 65 CRM LTX FREE POLYISOPRENE POLYMER BEAD ANTI

## (undated) DEVICE — DRAPE C ARM UNIV W41XL74IN CLR PLAS XR VELC CLSR POLY STRP

## (undated) DEVICE — 3M™ IOBAN™ 2 ANTIMICROBIAL INCISE DRAPE 6650EZ: Brand: IOBAN™ 2

## (undated) DEVICE — PADDING CAST W6INXL4YD COT LO LINTING WYTEX

## (undated) DEVICE — GLOVE SURG SZ 75 L12IN FNGR THK79MIL GRN LTX FREE

## (undated) DEVICE — COVER LT HNDL BLU PLAS

## (undated) DEVICE — SPONGE LAP W18XL18IN WHT COT 4 PLY FLD STRUNG RADPQ DISP ST

## (undated) DEVICE — ZIMMER® STERILE DISPOSABLE TOURNIQUET CUFF WITH PLC, DUAL PORT, SINGLE BLADDER, 34 IN. (86 CM)

## (undated) DEVICE — TAPE,CLOTH/SILK,CURAD,3"X10YD,LF,40/CS: Brand: CURAD

## (undated) DEVICE — OVERDRILL AO, DIA3.5MM X 122MM: Brand: VARIAX

## (undated) DEVICE — BLANKET WRM W29.9XL79.1IN UP BODY FORC AIR MISTRAL-AIR

## (undated) DEVICE — PADDING CAST W6INXL4YD POLY POR SPUN DACRON SYN VERSATILE

## (undated) DEVICE — DRAPE,U/ SHT,SPLIT,PLAS,STERIL: Brand: MEDLINE

## (undated) DEVICE — DRILL BIT, AO DIA2.6MM X 135MM, SCALED: Brand: VARIAX

## (undated) DEVICE — INTENDED FOR TISSUE SEPARATION, AND OTHER PROCEDURES THAT REQUIRE A SHARP SURGICAL BLADE TO PUNCTURE OR CUT.: Brand: BARD-PARKER ® CARBON RIB-BACK BLADES

## (undated) DEVICE — GOWN,SIRUS,NON REINFRCD,LARGE,SET IN SL: Brand: MEDLINE

## (undated) DEVICE — PADDING,UNDERCAST,COTTON, 4"X4YD STERILE: Brand: MEDLINE

## (undated) DEVICE — GLOVE SURG SZ 8 CRM LTX FREE POLYISOPRENE POLYMER BEAD ANTI